# Patient Record
Sex: FEMALE | Race: BLACK OR AFRICAN AMERICAN | Employment: FULL TIME | URBAN - METROPOLITAN AREA
[De-identification: names, ages, dates, MRNs, and addresses within clinical notes are randomized per-mention and may not be internally consistent; named-entity substitution may affect disease eponyms.]

---

## 2017-12-23 LAB — PAP SMEAR, EXTERNAL: NORMAL

## 2018-01-19 LAB — MICROALBUMIN UR TEST STR-MCNC: NORMAL MG/DL

## 2018-03-12 LAB
CREATININE, EXTERNAL: 0.69
LDL-C, EXTERNAL: 139

## 2018-10-11 ENCOUNTER — OP HISTORICAL/CONVERTED ENCOUNTER (OUTPATIENT)
Dept: OTHER | Age: 53
End: 2018-10-11

## 2019-02-25 ENCOUNTER — OP HISTORICAL/CONVERTED ENCOUNTER (OUTPATIENT)
Dept: OTHER | Age: 54
End: 2019-02-25

## 2019-06-07 ENCOUNTER — OP HISTORICAL/CONVERTED ENCOUNTER (OUTPATIENT)
Dept: OTHER | Age: 54
End: 2019-06-07

## 2019-10-31 ENCOUNTER — OP HISTORICAL/CONVERTED ENCOUNTER (OUTPATIENT)
Dept: OTHER | Age: 54
End: 2019-10-31

## 2019-10-31 LAB — MAMMOGRAPHY, EXTERNAL: NORMAL

## 2020-07-27 RX ORDER — AMLODIPINE BESYLATE 5 MG/1
TABLET ORAL
Qty: 30 TAB | Refills: 1 | Status: SHIPPED | OUTPATIENT
Start: 2020-07-27 | End: 2020-08-07 | Stop reason: SDUPTHER

## 2020-07-29 RX ORDER — PRAVASTATIN SODIUM 10 MG/1
TABLET ORAL
Qty: 30 TAB | Refills: 1 | Status: SHIPPED | OUTPATIENT
Start: 2020-07-29 | End: 2020-09-24 | Stop reason: SDUPTHER

## 2020-08-04 VITALS
RESPIRATION RATE: 18 BRPM | OXYGEN SATURATION: 96 % | SYSTOLIC BLOOD PRESSURE: 124 MMHG | TEMPERATURE: 98.2 F | DIASTOLIC BLOOD PRESSURE: 74 MMHG | HEIGHT: 62 IN | WEIGHT: 146.4 LBS | HEART RATE: 100 BPM | BODY MASS INDEX: 26.94 KG/M2

## 2020-08-04 PROBLEM — Z91.14 NONCOMPLIANCE WITH MEDICATION REGIMEN: Status: ACTIVE | Noted: 2020-08-04

## 2020-08-04 PROBLEM — R51.9 HEADACHE: Status: ACTIVE | Noted: 2020-08-04

## 2020-08-04 PROBLEM — S79.929A INJURY OF THIGH: Status: ACTIVE | Noted: 2020-08-04

## 2020-08-04 PROBLEM — L50.8 CHRONIC URTICARIA: Status: ACTIVE | Noted: 2020-08-04

## 2020-08-04 PROBLEM — R20.2 PARESTHESIA: Status: ACTIVE | Noted: 2020-08-04

## 2020-08-04 PROBLEM — N63.10 MASS OF RIGHT BREAST: Status: ACTIVE | Noted: 2020-08-04

## 2020-08-04 PROBLEM — E78.2 MIXED HYPERLIPIDEMIA: Status: ACTIVE | Noted: 2020-08-04

## 2020-08-04 PROBLEM — K21.9 GERD (GASTROESOPHAGEAL REFLUX DISEASE): Status: ACTIVE | Noted: 2020-08-04

## 2020-08-04 PROBLEM — I10 ESSENTIAL HYPERTENSION: Status: ACTIVE | Noted: 2020-08-04

## 2020-08-04 PROBLEM — M15.9 DEGENERATIVE JOINT DISEASE INVOLVING MULTIPLE JOINTS: Status: ACTIVE | Noted: 2020-08-04

## 2020-08-04 RX ORDER — FEXOFENADINE HCL AND PSEUDOEPHEDRINE HCI 60; 120 MG/1; MG/1
1 TABLET, EXTENDED RELEASE ORAL EVERY 12 HOURS
COMMUNITY
End: 2020-10-02

## 2020-08-04 RX ORDER — DOXYCYCLINE 100 MG/1
100 CAPSULE ORAL 2 TIMES DAILY
COMMUNITY
End: 2020-10-02

## 2020-08-04 RX ORDER — METHOCARBAMOL 750 MG/1
TABLET, FILM COATED ORAL 4 TIMES DAILY
COMMUNITY
End: 2020-10-02

## 2020-08-04 RX ORDER — LOPERAMIDE HCL 2 MG
2 TABLET ORAL
COMMUNITY
End: 2020-10-02

## 2020-08-04 RX ORDER — BENZONATATE 200 MG/1
200 CAPSULE ORAL
COMMUNITY
End: 2020-10-02

## 2020-08-04 RX ORDER — HYOSCYAMINE SULFATE 0.125 MG
125 TABLET ORAL
COMMUNITY
End: 2020-10-02

## 2020-08-04 RX ORDER — IBUPROFEN 600 MG/1
TABLET ORAL
COMMUNITY
End: 2020-10-02

## 2020-08-04 RX ORDER — AZITHROMYCIN 250 MG/1
250 TABLET, FILM COATED ORAL DAILY
COMMUNITY
End: 2020-10-02

## 2020-08-04 RX ORDER — MELOXICAM 15 MG/1
15 TABLET ORAL DAILY
COMMUNITY
End: 2020-10-02

## 2020-08-04 RX ORDER — ONDANSETRON 8 MG/1
8 TABLET, ORALLY DISINTEGRATING ORAL
COMMUNITY
End: 2020-10-02

## 2020-08-04 RX ORDER — METHYLPREDNISOLONE 4 MG/1
TABLET ORAL
COMMUNITY
End: 2020-10-02

## 2020-08-04 RX ORDER — MONTELUKAST SODIUM 10 MG/1
10 TABLET ORAL DAILY
COMMUNITY
End: 2020-10-02

## 2020-08-04 RX ORDER — DICLOFENAC SODIUM 75 MG/1
TABLET, DELAYED RELEASE ORAL
COMMUNITY
End: 2020-10-02

## 2020-08-04 RX ORDER — POLYETHYLENE GLYCOL 3350, SODIUM SULFATE ANHYDROUS, SODIUM BICARBONATE, SODIUM CHLORIDE, POTASSIUM CHLORIDE 236; 22.74; 6.74; 5.86; 2.97 G/4L; G/4L; G/4L; G/4L; G/4L
POWDER, FOR SOLUTION ORAL
COMMUNITY
End: 2020-10-02

## 2020-08-04 RX ORDER — SULFAMETHOXAZOLE AND TRIMETHOPRIM 800; 160 MG/1; MG/1
1 TABLET ORAL 2 TIMES DAILY
COMMUNITY
End: 2020-10-02

## 2020-08-04 RX ORDER — ACETAMINOPHEN 500 MG
TABLET ORAL
COMMUNITY

## 2020-08-07 ENCOUNTER — OFFICE VISIT (OUTPATIENT)
Dept: FAMILY MEDICINE CLINIC | Age: 55
End: 2020-08-07
Payer: COMMERCIAL

## 2020-08-07 VITALS
HEART RATE: 65 BPM | BODY MASS INDEX: 27.61 KG/M2 | HEIGHT: 63 IN | SYSTOLIC BLOOD PRESSURE: 143 MMHG | RESPIRATION RATE: 18 BRPM | DIASTOLIC BLOOD PRESSURE: 86 MMHG | OXYGEN SATURATION: 99 % | WEIGHT: 155.8 LBS | TEMPERATURE: 97.7 F

## 2020-08-07 DIAGNOSIS — I10 ESSENTIAL HYPERTENSION: ICD-10-CM

## 2020-08-07 DIAGNOSIS — Z12.4 PAP SMEAR FOR CERVICAL CANCER SCREENING: Primary | ICD-10-CM

## 2020-08-07 DIAGNOSIS — E78.2 MIXED HYPERLIPIDEMIA: ICD-10-CM

## 2020-08-07 DIAGNOSIS — M15.9 PRIMARY OSTEOARTHRITIS INVOLVING MULTIPLE JOINTS: ICD-10-CM

## 2020-08-07 DIAGNOSIS — Z12.31 ENCOUNTER FOR SCREENING MAMMOGRAM FOR BREAST CANCER: ICD-10-CM

## 2020-08-07 PROCEDURE — 99396 PREV VISIT EST AGE 40-64: CPT | Performed by: FAMILY MEDICINE

## 2020-08-07 RX ORDER — AMLODIPINE BESYLATE 5 MG/1
5 TABLET ORAL DAILY
Qty: 30 TAB | Refills: 2 | Status: SHIPPED | OUTPATIENT
Start: 2020-08-07 | End: 2020-10-23

## 2020-08-07 NOTE — PROGRESS NOTES
Michaela Starkey is a 47 y.o. female and presents with Gyn Exam  .  HPI     Subjective:  Cardiovascular Review:  The patient has hypertension   Diet and Lifestyle: generally follows a low fat low cholesterol diet, generally follows a low sodium diet, exercises sporadically  Home BP Monitoring: is not measured at home. Pertinent ROS: taking medications as instructed, no medication side effects noted, no TIA's, no chest pain on exertion, no dyspnea on exertion, no swelling of ankles. Review of Systems  Review of Systems   Constitutional: Negative. Negative for chills and fever. HENT: Negative. Negative for congestion, ear discharge, hearing loss, nosebleeds and tinnitus. Eyes: Negative. Negative for blurred vision, double vision, photophobia and pain. Respiratory: Negative. Negative for cough, hemoptysis and sputum production. Cardiovascular: Negative. Negative for chest pain and palpitations. Gastrointestinal: Negative. Negative for heartburn, nausea and vomiting. Genitourinary: Negative. Negative for dysuria, frequency and urgency. Musculoskeletal: Negative. Negative for back pain and myalgias. Skin: Negative. Neurological: Negative. Negative for dizziness, tingling, weakness and headaches. Endo/Heme/Allergies: Negative. Psychiatric/Behavioral: Negative. Negative for depression and suicidal ideas. The patient does not have insomnia. All other systems reviewed and are negative.         Past Medical History:   Diagnosis Date    Allergies     GERD (gastroesophageal reflux disease)     Headache     History of throat problem     Hypercholesterolemia     Hypertension     Sinus problem      Past Surgical History:   Procedure Laterality Date    HX GYN      HX ORTHOPAEDIC Left 2001    knee    HX PARTIAL HYSTERECTOMY      HX TUBAL LIGATION  1993     Social History     Socioeconomic History    Marital status:      Spouse name: Not on file    Number of children: Not on file    Years of education: Not on file    Highest education level: Not on file   Tobacco Use    Smoking status: Never Smoker    Smokeless tobacco: Never Used   Substance and Sexual Activity    Alcohol use: Yes     Frequency: Monthly or less     Family History   Problem Relation Age of Onset    Hypertension Father      Current Outpatient Medications   Medication Sig Dispense Refill    amLODIPine (NORVASC) 5 mg tablet Take 1 Tab by mouth daily. 30 Tab 2    acetaminophen (TYLENOL) 500 mg tablet Take  by mouth every six (6) hours as needed for Pain.  diclofenac EC (VOLTAREN) 75 mg EC tablet Take  by mouth.  hyoscyamine (ANASPAZ, LEVSIN) 0.125 mg tablet Take 125 mcg by mouth every four (4) hours as needed for Cramping.  loperamide (Imodium A-D) 2 mg tablet Take 2 mg by mouth four (4) times daily as needed for Diarrhea.  ondansetron (ZOFRAN ODT) 8 mg disintegrating tablet Take 8 mg by mouth every eight (8) hours as needed for Nausea or Vomiting.  fexofenadine-pseudoephedrine (Allegra-D 12 Hour)  mg Tb12 Take 1 Tab by mouth every twelve (12) hours.  benzonatate (TESSALON) 200 mg capsule Take 200 mg by mouth three (3) times daily as needed for Cough.  PEG 3350-Electrolytes (GaviLyte-G) 236-22.74-6.74 -5.86 gram suspension Take  by mouth now.  HYDROXYZINE HCL PO Take  by mouth.  ibuprofen (MOTRIN) 600 mg tablet Take  by mouth every six (6) hours as needed for Pain.  meloxicam (MOBIC) 15 mg tablet Take 15 mg by mouth daily.  methocarbamoL (ROBAXIN) 750 mg tablet Take  by mouth four (4) times daily.  methylPREDNISolone (MEDROL DOSEPACK) 4 mg tablet Take  by mouth.  montelukast (SINGULAIR) 10 mg tablet Take 10 mg by mouth daily.  doxycycline (VIBRAMYCIN) 100 mg capsule Take 100 mg by mouth two (2) times a day.  trimethoprim-sulfamethoxazole (BACTRIM DS, SEPTRA DS) 160-800 mg per tablet Take 1 Tab by mouth two (2) times a day.       azithromycin (ZITHROMAX) 250 mg tablet Take 250 mg by mouth daily.  pravastatin (PRAVACHOL) 10 mg tablet TAKE 1 TABLET BY MOUTH EVERY DAY 30 Tab 1     Allergies   Allergen Reactions    Cat Dander Other (comments)    Dog Dander Other (comments)    Pcn [Penicillins] Other (comments)       Objective:  Visit Vitals  /86 (BP 1 Location: Left arm, BP Patient Position: Sitting)   Pulse 65   Temp 97.7 °F (36.5 °C) (Temporal)   Resp 18   Ht 5' 3\" (1.6 m)   Wt 155 lb 12.8 oz (70.7 kg)   LMP  (LMP Unknown) Comment: partial hysterectomy   SpO2 99% Comment: room air   BMI 27.60 kg/m²       Physical Exam:   Physical Exam  Vitals signs and nursing note reviewed. Constitutional:       Appearance: Normal appearance. She is obese. HENT:      Head: Normocephalic and atraumatic. Right Ear: Tympanic membrane, ear canal and external ear normal.      Left Ear: Tympanic membrane, ear canal and external ear normal.      Nose: Nose normal.      Mouth/Throat:      Mouth: Mucous membranes are moist.      Pharynx: Oropharynx is clear. No oropharyngeal exudate or posterior oropharyngeal erythema. Eyes:      General: No scleral icterus. Right eye: No discharge. Left eye: No discharge. Extraocular Movements: Extraocular movements intact. Conjunctiva/sclera: Conjunctivae normal.      Pupils: Pupils are equal, round, and reactive to light. Neck:      Musculoskeletal: Normal range of motion and neck supple. No neck rigidity or muscular tenderness. Vascular: No carotid bruit. Cardiovascular:      Rate and Rhythm: Normal rate. Pulses: Normal pulses. Heart sounds: Normal heart sounds. No murmur. No gallop. Pulmonary:      Effort: Pulmonary effort is normal. No respiratory distress. Breath sounds: Normal breath sounds. No stridor. No wheezing, rhonchi or rales. Chest:      Chest wall: No tenderness. Abdominal:      General: Bowel sounds are normal. There is no distension. Palpations: Abdomen is soft. There is no mass. Tenderness: There is no abdominal tenderness. There is no right CVA tenderness, left CVA tenderness or rebound. Hernia: No hernia is present. Genitourinary:     General: Normal vulva. Vagina: No vaginal discharge. Musculoskeletal: Normal range of motion. General: No swelling, tenderness, deformity or signs of injury. Right lower leg: No edema. Left lower leg: No edema. Lymphadenopathy:      Cervical: No cervical adenopathy. Skin:     General: Skin is warm. Capillary Refill: Capillary refill takes 2 to 3 seconds. Coloration: Skin is not jaundiced or pale. Findings: No bruising, erythema, lesion or rash. Neurological:      General: No focal deficit present. Mental Status: She is alert and oriented to person, place, and time. Cranial Nerves: No cranial nerve deficit. Sensory: No sensory deficit. Motor: No weakness. Coordination: Coordination normal.      Gait: Gait normal.      Deep Tendon Reflexes: Reflexes normal.   Psychiatric:         Mood and Affect: Mood normal.         Behavior: Behavior normal.         Thought Content: Thought content normal.         Judgment: Judgment normal.             Results for orders placed or performed in visit on 08/04/20    MAMMOGRAPHY   Result Value Ref Range    Mammography, External       3.8 mm oval nodule in the medial right breast may be an intramammary lymph node. AMB EXT LDL-C   Result Value Ref Range    LDL-C, External 139    AMB EXT CREATININE   Result Value Ref Range    Creatinine, External 0.69    AMB EXT URINE MICROALBUMIN   Result Value Ref Range    Urine Microalbumin, External Neg.  PAP SMEAR   Result Value Ref Range    Pap Smear, External WNL. Assessment/Plan:    ICD-10-CM ICD-9-CM    1. Pap smear for cervical cancer screening  Z12.4 V76.2 PAP SMEAR, NON-TRADITIONAL      [facility] MAMMO BI SCREENING INCL CAD   2.  Encounter for screening mammogram for breast cancer  Z12.31 V76.12 PAP SMEAR, NON-TRADITIONAL      [facility] MAMMO BI SCREENING INCL CAD   3. Mixed hyperlipidemia  E78.2 272.2    4. Primary osteoarthritis involving multiple joints  M89.49 715.98    5. Essential hypertension  I10 401.9      Orders Placed This Encounter    [facility] MAMMO BI SCREENING INCL CAD     Standing Status:   Future     Standing Expiration Date:   9/7/2021    amLODIPine (NORVASC) 5 mg tablet     Sig: Take 1 Tab by mouth daily. Dispense:  30 Tab     Refill:  2    PAP SMEAR, NON-TRADITIONAL     Order Specific Question:   Pap Source? Answer:   Vaginal     Order Specific Question:   Total Hysterectomy? Answer:   No     Order Specific Question:   Supracervical Hysterectomy? Answer:   Yes     Order Specific Question:   Post Menopausal?     Answer:   No     Order Specific Question:   Hormone Therapy? Answer:   No     Order Specific Question:   IUD? Answer:   No     Order Specific Question:   Abnormal Bleeding? Answer:   No     Order Specific Question:   Pregnant     Answer:   No     Order Specific Question:   Post Partum? Answer:   No     Cannot display discharge medications since this is not an admission. Follow-up and Dispositions    · Return in about 3 months (around 11/7/2020).

## 2020-08-13 LAB
AMBIGUOUS MONOLAYER PAP: NORMAL
CYTOLOGIST CVX/VAG CYTO: NORMAL
CYTOLOGY CVX/VAG DOC CYTO: NORMAL
DX ICD CODE: NORMAL
Lab: NORMAL
OTHER STN SPEC: NORMAL
STAT OF ADQ CVX/VAG CYTO-IMP: NORMAL

## 2020-09-06 PROBLEM — Z12.4 PAP SMEAR FOR CERVICAL CANCER SCREENING: Status: RESOLVED | Noted: 2020-08-07 | Resolved: 2020-09-06

## 2020-09-06 PROBLEM — Z12.31 ENCOUNTER FOR SCREENING MAMMOGRAM FOR BREAST CANCER: Status: RESOLVED | Noted: 2020-08-07 | Resolved: 2020-09-06

## 2020-09-24 RX ORDER — PRAVASTATIN SODIUM 10 MG/1
10 TABLET ORAL
Qty: 60 TAB | Refills: 0 | Status: SHIPPED | OUTPATIENT
Start: 2020-09-24 | End: 2020-11-24

## 2020-10-02 ENCOUNTER — OFFICE VISIT (OUTPATIENT)
Dept: FAMILY MEDICINE CLINIC | Age: 55
End: 2020-10-02
Payer: COMMERCIAL

## 2020-10-02 VITALS
HEIGHT: 63 IN | WEIGHT: 155.2 LBS | TEMPERATURE: 98.4 F | DIASTOLIC BLOOD PRESSURE: 80 MMHG | SYSTOLIC BLOOD PRESSURE: 150 MMHG | RESPIRATION RATE: 20 BRPM | OXYGEN SATURATION: 98 % | BODY MASS INDEX: 27.5 KG/M2 | HEART RATE: 81 BPM

## 2020-10-02 DIAGNOSIS — M15.9 PRIMARY OSTEOARTHRITIS INVOLVING MULTIPLE JOINTS: ICD-10-CM

## 2020-10-02 DIAGNOSIS — Z96.652 HISTORY OF ARTHROPLASTY OF LEFT KNEE: ICD-10-CM

## 2020-10-02 DIAGNOSIS — Z98.890 HX OF LEFT KNEE SURGERY: ICD-10-CM

## 2020-10-02 DIAGNOSIS — I11.9 MALIGNANT HYPERTENSIVE HEART DISEASE WITHOUT HEART FAILURE: Primary | ICD-10-CM

## 2020-10-02 DIAGNOSIS — E78.2 MIXED HYPERLIPIDEMIA: ICD-10-CM

## 2020-10-02 PROCEDURE — 99214 OFFICE O/P EST MOD 30 MIN: CPT | Performed by: FAMILY MEDICINE

## 2020-10-02 RX ORDER — NAPROXEN 500 MG/1
500 TABLET ORAL 2 TIMES DAILY WITH MEALS
Qty: 30 TAB | Refills: 1 | Status: SHIPPED | OUTPATIENT
Start: 2020-10-02

## 2020-10-02 NOTE — PROGRESS NOTES
Alexis Hinson is a 47 y.o. female and presents with Hip Pain (bilateral stiffness when she goes from sitting to standing); Arm Pain (right); and Hand Pain (right)  . HPI   Pt with a hx of HTN c/o bilateral hip  To lower back   Subjective:  Cardiovascular Review:  The patient has hypertension   Diet and Lifestyle: generally follows a low fat low cholesterol diet, generally follows a low sodium diet, exercises sporadically  Home BP Monitoring: is not measured at home. Pertinent ROS: taking medications as instructed, no medication side effects noted, no TIA's, no chest pain on exertion, no dyspnea on exertion, no swelling of ankles. Review of Systems  Review of Systems   Constitutional: Negative. Negative for chills and fever. HENT: Negative. Negative for congestion, ear discharge, hearing loss, nosebleeds and tinnitus. Eyes: Negative. Negative for blurred vision, double vision, photophobia and pain. Respiratory: Negative. Negative for cough, hemoptysis and sputum production. Cardiovascular: Negative. Negative for chest pain and palpitations. Gastrointestinal: Negative. Negative for heartburn, nausea and vomiting. Genitourinary: Negative. Negative for dysuria, frequency and urgency. Musculoskeletal: Positive for joint pain. Negative for back pain and myalgias. Skin: Negative. Neurological: Negative. Negative for dizziness, tingling, weakness and headaches. Endo/Heme/Allergies: Negative. Psychiatric/Behavioral: Negative. Negative for depression and suicidal ideas. The patient does not have insomnia. All other systems reviewed and are negative.         Past Medical History:   Diagnosis Date    Allergies     GERD (gastroesophageal reflux disease)     Headache     History of throat problem     Hypercholesterolemia     Hypertension     Sinus problem      Past Surgical History:   Procedure Laterality Date    HX GYN      HX ORTHOPAEDIC Left 2001    knee    HX PARTIAL HYSTERECTOMY      HX TUBAL LIGATION  1993     Social History     Socioeconomic History    Marital status:      Spouse name: Not on file    Number of children: Not on file    Years of education: Not on file    Highest education level: Not on file   Tobacco Use    Smoking status: Never Smoker    Smokeless tobacco: Never Used   Substance and Sexual Activity    Alcohol use: Yes     Frequency: Monthly or less     Family History   Problem Relation Age of Onset    Hypertension Father      Current Outpatient Medications   Medication Sig Dispense Refill    pravastatin (PRAVACHOL) 10 mg tablet Take 1 Tab by mouth nightly for 60 days. 60 Tab 0    amLODIPine (NORVASC) 5 mg tablet Take 1 Tab by mouth daily. 30 Tab 2    acetaminophen (TYLENOL) 500 mg tablet Take  by mouth every six (6) hours as needed for Pain. Allergies   Allergen Reactions    Cat Dander Other (comments)    Dog Dander Other (comments)    Pcn [Penicillins] Other (comments)       Objective:  Visit Vitals  BP (!) 150/80 (BP 1 Location: Left arm, BP Patient Position: Sitting)   Pulse 81   Temp 98.4 °F (36.9 °C) (Temporal)   Resp 20   Ht 5' 3\" (1.6 m)   Wt 155 lb 3.2 oz (70.4 kg)   SpO2 98% Comment: room air   BMI 27.49 kg/m²       Physical Exam:   Physical Exam  Vitals signs and nursing note reviewed. Constitutional:       Appearance: Normal appearance. She is obese. HENT:      Head: Normocephalic and atraumatic. Right Ear: Tympanic membrane, ear canal and external ear normal.      Left Ear: Tympanic membrane, ear canal and external ear normal.      Nose: Nose normal.      Mouth/Throat:      Mouth: Mucous membranes are moist.      Pharynx: Oropharynx is clear. No oropharyngeal exudate or posterior oropharyngeal erythema. Eyes:      General: No scleral icterus. Right eye: No discharge. Left eye: No discharge. Extraocular Movements: Extraocular movements intact.       Conjunctiva/sclera: Conjunctivae normal. Pupils: Pupils are equal, round, and reactive to light. Neck:      Musculoskeletal: Normal range of motion and neck supple. No neck rigidity or muscular tenderness. Vascular: No carotid bruit. Cardiovascular:      Rate and Rhythm: Normal rate. Pulses: Normal pulses. Heart sounds: Normal heart sounds. No murmur. No gallop. Pulmonary:      Effort: Pulmonary effort is normal. No respiratory distress. Breath sounds: Normal breath sounds. No stridor. No wheezing, rhonchi or rales. Chest:      Chest wall: No tenderness. Abdominal:      General: Bowel sounds are normal. There is no distension. Palpations: Abdomen is soft. There is no mass. Tenderness: There is no abdominal tenderness. There is no right CVA tenderness, left CVA tenderness or rebound. Hernia: No hernia is present. Musculoskeletal: Normal range of motion. General: No swelling, tenderness, deformity or signs of injury. Right lower leg: No edema. Left lower leg: No edema. Lymphadenopathy:      Cervical: No cervical adenopathy. Skin:     General: Skin is warm. Capillary Refill: Capillary refill takes 2 to 3 seconds. Coloration: Skin is not jaundiced or pale. Findings: No bruising, erythema, lesion or rash. Neurological:      General: No focal deficit present. Mental Status: She is alert and oriented to person, place, and time. Cranial Nerves: No cranial nerve deficit. Sensory: No sensory deficit. Motor: No weakness. Coordination: Coordination normal.      Gait: Gait normal.      Deep Tendon Reflexes: Reflexes normal.   Psychiatric:         Mood and Affect: Mood normal.         Behavior: Behavior normal.         Thought Content:  Thought content normal.         Judgment: Judgment normal.             Results for orders placed or performed in visit on 08/07/20   PAP, LIQUID BASED, MANUAL SCREEN   Result Value Ref Range    Diagnosis Comment     Specimen adequacy Comment     Clinician provided ICD10 Comment     Performed by: Comment     . Denver Gross Note: Comment    AMBIGUOUS MONOLAYER PAP   Result Value Ref Range    AMBIGUOUS MONOLAYER PAP Comment        Assessment/Plan:    ICD-10-CM ICD-9-CM    1. Malignant hypertensive heart disease without heart failure  I11.9 402.00 LIPID PANEL      METABOLIC PANEL, BASIC   2. Mixed hyperlipidemia  E78.2 272.2 LIPID PANEL      METABOLIC PANEL, BASIC   3. Primary osteoarthritis involving multiple joints  M89.49 715.98    4. Hx of left knee surgery  Z98.890 V15.29    5. History of arthroplasty of left knee  Z96.652 V43.65      No orders of the defined types were placed in this encounter. Cannot display discharge medications since this is not an admission.

## 2020-10-23 RX ORDER — AMLODIPINE BESYLATE 5 MG/1
TABLET ORAL
Qty: 30 TAB | Refills: 2 | Status: SHIPPED | OUTPATIENT
Start: 2020-10-23 | End: 2021-01-24

## 2020-11-13 ENCOUNTER — TRANSCRIBE ORDER (OUTPATIENT)
Dept: SCHEDULING | Age: 55
End: 2020-11-13

## 2020-11-13 DIAGNOSIS — Z12.31 VISIT FOR SCREENING MAMMOGRAM: Primary | ICD-10-CM

## 2020-11-24 RX ORDER — PRAVASTATIN SODIUM 10 MG/1
TABLET ORAL
Qty: 60 TAB | Refills: 0 | Status: SHIPPED | OUTPATIENT
Start: 2020-11-24 | End: 2021-01-24

## 2020-12-17 ENCOUNTER — OFFICE VISIT (OUTPATIENT)
Dept: FAMILY MEDICINE CLINIC | Age: 55
End: 2020-12-17
Payer: COMMERCIAL

## 2020-12-17 VITALS
SYSTOLIC BLOOD PRESSURE: 150 MMHG | HEIGHT: 62 IN | RESPIRATION RATE: 20 BRPM | OXYGEN SATURATION: 99 % | TEMPERATURE: 96.8 F | BODY MASS INDEX: 29.08 KG/M2 | WEIGHT: 158 LBS | HEART RATE: 93 BPM | DIASTOLIC BLOOD PRESSURE: 86 MMHG

## 2020-12-17 DIAGNOSIS — R10.13 EPIGASTRIC PAIN: Primary | ICD-10-CM

## 2020-12-17 DIAGNOSIS — K59.1 FUNCTIONAL DIARRHEA: ICD-10-CM

## 2020-12-17 DIAGNOSIS — R10.32 LEFT LOWER QUADRANT ABDOMINAL PAIN: ICD-10-CM

## 2020-12-17 LAB
BILIRUB UR QL STRIP: NEGATIVE
GLUCOSE UR-MCNC: NEGATIVE MG/DL
KETONES P FAST UR STRIP-MCNC: NEGATIVE MG/DL
PH UR STRIP: 5.5 [PH] (ref 4.6–8)
PROT UR QL STRIP: NEGATIVE
SP GR UR STRIP: 1.01 (ref 1–1.03)
UA UROBILINOGEN AMB POC: NORMAL (ref 0.2–1)
URINALYSIS CLARITY POC: CLEAR
URINALYSIS COLOR POC: YELLOW
URINE BLOOD POC: NORMAL
URINE LEUKOCYTES POC: NEGATIVE
URINE NITRITES POC: NEGATIVE

## 2020-12-17 PROCEDURE — 81003 URINALYSIS AUTO W/O SCOPE: CPT | Performed by: FAMILY MEDICINE

## 2020-12-17 PROCEDURE — 99213 OFFICE O/P EST LOW 20 MIN: CPT | Performed by: FAMILY MEDICINE

## 2020-12-17 RX ORDER — HYOSCYAMINE SULFATE 0.125 MG
125 TABLET ORAL
Qty: 20 TAB | Refills: 0 | Status: SHIPPED | OUTPATIENT
Start: 2020-12-17 | End: 2020-12-22

## 2020-12-17 NOTE — PROGRESS NOTES
HPI    Alexis Hinson is a 47 y.o. female and presents today for Abdominal Pain (since yesterday)  . HPI   46 yo AAF with a hx of HTN c/o a cramping lower abdominal pain 8/10 intensity with radiation from the epigastric and suprapubic area to Bilateral lower quadrants. Denies heartburn or N and V. Denies dysuria or frequency but states diarrhea x 2 last night and this morning x 2 . Had apartial hysterectomy over 5 years ago and no menses since then  Allergies    Allergies   Allergen Reactions    Cat Dander Other (comments)    Dog Dander Other (comments)    Pcn [Penicillins] Other (comments)        Medications    Current Outpatient Medications   Medication Sig Dispense    pravastatin (PRAVACHOL) 10 mg tablet TAKE 1 TABLET BY MOUTH EVERY NIGHT 60 Tab    amLODIPine (NORVASC) 5 mg tablet TAKE 1 TABLET BY MOUTH EVERY DAY IN THE MORNING 30 Tab    naproxen (NAPROSYN) 500 mg tablet Take 1 Tab by mouth two (2) times daily (with meals). 30 Tab    acetaminophen (TYLENOL) 500 mg tablet Take  by mouth every six (6) hours as needed for Pain. No current facility-administered medications for this visit.          Health Maintenance    Health Maintenance Due   Topic Date Due    Hepatitis C Screening  1965    DTaP/Tdap/Td series (1 - Tdap) 12/28/1986    Shingrix Vaccine Age 50> (1 of 2) 12/28/2015    Colorectal Cancer Screening Combo  12/28/2015    Lipid Screen  03/12/2019        Problem List    Patient Active Problem List    Diagnosis Date Noted    Hx of left knee surgery 10/02/2020    History of arthroplasty of left knee 10/02/2020    Malignant hypertensive heart disease without heart failure 10/02/2020    Body mass index (bmi) 25.0-25.9, adult 08/04/2020    Chronic urticaria 08/04/2020    Degenerative joint disease involving multiple joints 08/04/2020    Essential hypertension 08/04/2020    GERD (gastroesophageal reflux disease) 08/04/2020    Headache 08/04/2020    Injury of thigh 08/04/2020    Mass of right breast 08/04/2020    Mixed hyperlipidemia 08/04/2020    Noncompliance with medication regimen 08/04/2020    Paresthesia 08/04/2020        Family Hx    Family History   Problem Relation Age of Onset    Hypertension Father         Social Hx    Social History     Socioeconomic History    Marital status:      Spouse name: Not on file    Number of children: Not on file    Years of education: Not on file    Highest education level: Not on file   Tobacco Use    Smoking status: Never Smoker    Smokeless tobacco: Never Used   Substance and Sexual Activity    Alcohol use: Yes     Frequency: Monthly or less        Surgical Hx    Past Surgical History:   Procedure Laterality Date    HX GYN      HX ORTHOPAEDIC Left 2001    knee    HX PARTIAL HYSTERECTOMY      HX TUBAL LIGATION  1993          Vitals    Visit Vitals  BP (!) 150/86 (BP 1 Location: Right arm, BP Patient Position: Sitting)   Pulse 93   Temp 96.8 °F (36 °C) (Temporal)   Resp 20   Ht 5' 2\" (1.575 m)   Wt 158 lb (71.7 kg)   LMP  (LMP Unknown) Comment: partial hysterectomy   SpO2 99% Comment: room air   BMI 28.90 kg/m²        ROS    Review of Systems   Constitutional: Negative. Negative for chills and fever. HENT: Negative. Negative for congestion, ear discharge, hearing loss, nosebleeds and tinnitus. Eyes: Negative. Negative for blurred vision, double vision, photophobia and pain. Respiratory: Negative. Negative for cough, hemoptysis and sputum production. Cardiovascular: Negative. Negative for chest pain and palpitations. Gastrointestinal: Positive for abdominal pain and diarrhea. Negative for heartburn, nausea and vomiting. Genitourinary: Negative. Negative for dysuria, frequency and urgency. Musculoskeletal: Negative. Negative for back pain and myalgias. Skin: Negative. Neurological: Negative. Negative for dizziness, tingling, weakness and headaches. Endo/Heme/Allergies: Negative.     Psychiatric/Behavioral: Negative. Negative for depression and suicidal ideas. The patient does not have insomnia. All other systems reviewed and are negative. Physical Exam      Physical Exam  Vitals signs and nursing note reviewed. Constitutional:       Appearance: Normal appearance. She is obese. HENT:      Head: Normocephalic and atraumatic. Right Ear: Tympanic membrane, ear canal and external ear normal.      Left Ear: Tympanic membrane, ear canal and external ear normal.      Nose: Nose normal.      Mouth/Throat:      Mouth: Mucous membranes are moist.      Pharynx: Oropharynx is clear. No oropharyngeal exudate or posterior oropharyngeal erythema. Eyes:      General: No scleral icterus. Right eye: No discharge. Left eye: No discharge. Extraocular Movements: Extraocular movements intact. Conjunctiva/sclera: Conjunctivae normal.      Pupils: Pupils are equal, round, and reactive to light. Neck:      Musculoskeletal: Normal range of motion and neck supple. No neck rigidity or muscular tenderness. Vascular: No carotid bruit. Cardiovascular:      Rate and Rhythm: Normal rate. Pulses: Normal pulses. Heart sounds: Normal heart sounds. No murmur. No gallop. Pulmonary:      Effort: Pulmonary effort is normal. No respiratory distress. Breath sounds: Normal breath sounds. No stridor. No wheezing, rhonchi or rales. Chest:      Chest wall: No tenderness. Abdominal:      General: Bowel sounds are normal. There is no distension. Palpations: Abdomen is soft. There is no mass. Tenderness: There is no abdominal tenderness (epigastric and suprapubic). There is no right CVA tenderness, left CVA tenderness or rebound. Hernia: No hernia is present. Musculoskeletal: Normal range of motion. General: No swelling, tenderness, deformity or signs of injury. Right lower leg: No edema. Left lower leg: No edema.    Lymphadenopathy:      Cervical: No cervical adenopathy. Skin:     General: Skin is warm. Capillary Refill: Capillary refill takes 2 to 3 seconds. Coloration: Skin is not jaundiced or pale. Findings: No bruising, erythema, lesion or rash. Neurological:      General: No focal deficit present. Mental Status: She is alert and oriented to person, place, and time. Cranial Nerves: No cranial nerve deficit. Sensory: No sensory deficit. Motor: No weakness. Coordination: Coordination normal.      Gait: Gait normal.      Deep Tendon Reflexes: Reflexes normal.   Psychiatric:         Mood and Affect: Mood normal.         Behavior: Behavior normal.         Thought Content: Thought content normal.         Judgment: Judgment normal.          Assessment/Plan    Diagnoses and all orders for this visit:    1. Epigastric pain         Health Maintenance Items reviewed with patient as noted.

## 2021-01-04 ENCOUNTER — OFFICE VISIT (OUTPATIENT)
Dept: FAMILY MEDICINE CLINIC | Age: 56
End: 2021-01-04
Payer: COMMERCIAL

## 2021-01-04 VITALS
HEART RATE: 79 BPM | OXYGEN SATURATION: 99 % | RESPIRATION RATE: 18 BRPM | BODY MASS INDEX: 28.78 KG/M2 | DIASTOLIC BLOOD PRESSURE: 86 MMHG | WEIGHT: 156.4 LBS | SYSTOLIC BLOOD PRESSURE: 142 MMHG | TEMPERATURE: 97.3 F | HEIGHT: 62 IN

## 2021-01-04 DIAGNOSIS — E78.2 MIXED HYPERLIPIDEMIA: Primary | ICD-10-CM

## 2021-01-04 DIAGNOSIS — E66.3 OVERWEIGHT (BMI 25.0-29.9): ICD-10-CM

## 2021-01-04 DIAGNOSIS — I11.9 MALIGNANT HYPERTENSIVE HEART DISEASE WITHOUT HEART FAILURE: ICD-10-CM

## 2021-01-04 DIAGNOSIS — D17.1 LIPOMA OF CHEST WALL: ICD-10-CM

## 2021-01-04 DIAGNOSIS — K21.00 GASTROESOPHAGEAL REFLUX DISEASE WITH ESOPHAGITIS WITHOUT HEMORRHAGE: ICD-10-CM

## 2021-01-04 PROCEDURE — 99214 OFFICE O/P EST MOD 30 MIN: CPT | Performed by: FAMILY MEDICINE

## 2021-01-04 NOTE — PROGRESS NOTES
Melvyn Dakins is a 54 y.o. female and presents with Follow Up Chronic Condition and Hypertension  . HPI   Patient c/o left lateral chest asymmetry with a soft growth for several months. Denies pain or trauma    Subjective:  Cardiovascular Review:  The patient has hypertension   Diet and Lifestyle: generally follows a low fat low cholesterol diet, generally follows a low sodium diet, exercises sporadically  Home BP Monitoring: is not measured at home. Pertinent ROS: taking medications as instructed, no medication side effects noted, no TIA's, no chest pain on exertion, no dyspnea on exertion, no swelling of ankles. Review of Systems  Review of Systems   Constitutional: Negative. Negative for chills and fever. HENT: Negative. Negative for congestion, ear discharge, hearing loss, nosebleeds and tinnitus. Eyes: Negative. Negative for blurred vision, double vision, photophobia and pain. Respiratory: Negative. Negative for cough, hemoptysis and sputum production. Cardiovascular: Negative. Negative for chest pain and palpitations. Gastrointestinal: Negative. Negative for heartburn, nausea and vomiting. Genitourinary: Negative. Negative for dysuria, frequency and urgency. Musculoskeletal: Negative. Negative for back pain and myalgias. Skin: Negative. Neurological: Negative. Negative for dizziness, tingling, weakness and headaches. Endo/Heme/Allergies: Negative. Psychiatric/Behavioral: Negative. Negative for depression and suicidal ideas. The patient does not have insomnia. All other systems reviewed and are negative.         Past Medical History:   Diagnosis Date    Allergies     GERD (gastroesophageal reflux disease)     Headache     History of throat problem     Hypercholesterolemia     Hypertension     Sinus problem      Past Surgical History:   Procedure Laterality Date    HX GYN      HX ORTHOPAEDIC Left 2001    knee    HX PARTIAL HYSTERECTOMY      HX TUBAL LIGATION  1993     Social History     Socioeconomic History    Marital status:      Spouse name: Not on file    Number of children: Not on file    Years of education: Not on file    Highest education level: Not on file   Tobacco Use    Smoking status: Never Smoker    Smokeless tobacco: Never Used   Substance and Sexual Activity    Alcohol use: Yes     Frequency: Monthly or less     Family History   Problem Relation Age of Onset    Hypertension Father      Current Outpatient Medications   Medication Sig Dispense Refill    pravastatin (PRAVACHOL) 10 mg tablet TAKE 1 TABLET BY MOUTH EVERY NIGHT 60 Tab 0    amLODIPine (NORVASC) 5 mg tablet TAKE 1 TABLET BY MOUTH EVERY DAY IN THE MORNING 30 Tab 2    naproxen (NAPROSYN) 500 mg tablet Take 1 Tab by mouth two (2) times daily (with meals). 30 Tab 1    acetaminophen (TYLENOL) 500 mg tablet Take  by mouth every six (6) hours as needed for Pain. Allergies   Allergen Reactions    Cat Dander Other (comments)    Dog Dander Other (comments)    Pcn [Penicillins] Other (comments)       Objective:  Visit Vitals  BP (!) 142/86 (BP 1 Location: Right arm, BP Patient Position: Sitting)   Pulse 79   Temp 97.3 °F (36.3 °C) (Temporal)   Resp 18   Ht 5' 2\" (1.575 m)   Wt 156 lb 6.4 oz (70.9 kg)   LMP  (LMP Unknown) Comment: partial hysterectomy   SpO2 99% Comment: room air   BMI 28.61 kg/m²       Physical Exam:   Physical Exam  Vitals signs and nursing note reviewed. Constitutional:       Appearance: Normal appearance. She is obese. HENT:      Head: Normocephalic and atraumatic. Right Ear: Tympanic membrane, ear canal and external ear normal.      Left Ear: Tympanic membrane, ear canal and external ear normal.      Nose: Nose normal.      Mouth/Throat:      Mouth: Mucous membranes are moist.      Pharynx: Oropharynx is clear. No oropharyngeal exudate or posterior oropharyngeal erythema. Eyes:      General: No scleral icterus.         Right eye: No discharge. Left eye: No discharge. Extraocular Movements: Extraocular movements intact. Conjunctiva/sclera: Conjunctivae normal.      Pupils: Pupils are equal, round, and reactive to light. Neck:      Musculoskeletal: Normal range of motion and neck supple. No neck rigidity or muscular tenderness. Vascular: No carotid bruit. Cardiovascular:      Rate and Rhythm: Normal rate. Pulses: Normal pulses. Heart sounds: Normal heart sounds. No murmur. No gallop. Pulmonary:      Effort: Pulmonary effort is normal. No respiratory distress. Breath sounds: Normal breath sounds. No stridor. No wheezing, rhonchi or rales. Chest:      Chest wall: No tenderness. Abdominal:      General: Bowel sounds are normal. There is no distension. Palpations: Abdomen is soft. There is no mass. Tenderness: There is no abdominal tenderness. There is no right CVA tenderness, left CVA tenderness or rebound. Hernia: No hernia is present. Musculoskeletal: Normal range of motion. General: Deformity (left lateral midchest 5x6cm round soft nontender growth) present. No swelling, tenderness or signs of injury. Right lower leg: No edema. Left lower leg: No edema. Lymphadenopathy:      Cervical: No cervical adenopathy. Skin:     General: Skin is warm. Capillary Refill: Capillary refill takes 2 to 3 seconds. Coloration: Skin is not jaundiced or pale. Findings: No bruising, erythema, lesion or rash. Neurological:      General: No focal deficit present. Mental Status: She is alert and oriented to person, place, and time. Cranial Nerves: No cranial nerve deficit. Sensory: No sensory deficit. Motor: No weakness. Coordination: Coordination normal.      Gait: Gait normal.      Deep Tendon Reflexes: Reflexes normal.   Psychiatric:         Mood and Affect: Mood normal.         Behavior: Behavior normal.         Thought Content:  Thought content normal.         Judgment: Judgment normal.             Results for orders placed or performed in visit on 12/17/20   AMB POC URINALYSIS DIP STICK AUTO W/O MICRO   Result Value Ref Range    Color (UA POC) Yellow     Clarity (UA POC) Clear     Glucose (UA POC) Negative Negative    Bilirubin (UA POC) Negative Negative    Ketones (UA POC) Negative Negative    Specific gravity (UA POC) 1.015 1.001 - 1.035    Blood (UA POC) Trace Negative    pH (UA POC) 5.5 4.6 - 8.0    Protein (UA POC) Negative Negative    Urobilinogen (UA POC) 0.2 mg/dL 0.2 - 1    Nitrites (UA POC) Negative Negative    Leukocyte esterase (UA POC) Negative Negative       Assessment/Plan:    ICD-10-CM ICD-9-CM    1. Mixed hyperlipidemia  E78.2 272.2 LIPID PANEL      METABOLIC PANEL, COMPREHENSIVE   2. Malignant hypertensive heart disease without heart failure  Q90.4 955.36 METABOLIC PANEL, COMPREHENSIVE   3. Gastroesophageal reflux disease with esophagitis without hemorrhage  K21.00 530.81      530.10    4. Lipoma of chest wall  D17.1 214.8     Surgery discouraged-Weight loss counseling effected   5. Overweight (BMI 25.0-29. 9)  E66.3 278.02      No orders of the defined types were placed in this encounter. Cannot display discharge medications since this is not an admission.

## 2021-01-04 NOTE — PATIENT INSTRUCTIONS
Low Sodium Diet (2,000 Milligram): Care Instructions Your Care Instructions Too much sodium causes your body to hold on to extra water. This can raise your blood pressure and force your heart and kidneys to work harder. In very serious cases, this could cause you to be put in the hospital. It might even be life-threatening. By limiting sodium, you will feel better and lower your risk of serious problems. The most common source of sodium is salt. People get most of the salt in their diet from canned, prepared, and packaged foods. Fast food and restaurant meals also are very high in sodium. Your doctor will probably limit your sodium to less than 2,000 milligrams (mg) a day. This limit counts all the sodium in prepared and packaged foods and any salt you add to your food. Follow-up care is a key part of your treatment and safety. Be sure to make and go to all appointments, and call your doctor if you are having problems. It's also a good idea to know your test results and keep a list of the medicines you take. How can you care for yourself at home? Read food labels · Read labels on cans and food packages. The labels tell you how much sodium is in each serving. Make sure that you look at the serving size. If you eat more than the serving size, you have eaten more sodium. · Food labels also tell you the Percent Daily Value for sodium. Choose products with low Percent Daily Values for sodium. · Be aware that sodium can come in forms other than salt, including monosodium glutamate (MSG), sodium citrate, and sodium bicarbonate (baking soda). MSG is often added to Asian food. When you eat out, you can sometimes ask for food without MSG or added salt. Buy low-sodium foods · Buy foods that are labeled \"unsalted\" (no salt added), \"sodium-free\" (less than 5 mg of sodium per serving), or \"low-sodium\" (less than 140 mg of sodium per serving). Foods labeled \"reduced-sodium\" and \"light sodium\" may still have too much sodium. Be sure to read the label to see how much sodium you are getting. · Buy fresh vegetables, or frozen vegetables without added sauces. Buy low-sodium versions of canned vegetables, soups, and other canned goods. Prepare low-sodium meals · Cut back on the amount of salt you use in cooking. This will help you adjust to the taste. Do not add salt after cooking. One teaspoon of salt has about 2,300 mg of sodium. · Take the salt shaker off the table. · Flavor your food with garlic, lemon juice, onion, vinegar, herbs, and spices. Do not use soy sauce, lite soy sauce, steak sauce, onion salt, garlic salt, celery salt, mustard, or ketchup on your food. · Use low-sodium salad dressings, sauces, and ketchup. Or make your own salad dressings and sauces without adding salt. · Use less salt (or none) when recipes call for it. You can often use half the salt a recipe calls for without losing flavor. Other foods such as rice, pasta, and grains do not need added salt. · Rinse canned vegetables, and cook them in fresh water. This removes somebut not allof the salt. · Avoid water that is naturally high in sodium or that has been treated with water softeners, which add sodium. Call your local water company to find out the sodium content of your water supply. If you buy bottled water, read the label and choose a sodium-free brand. Avoid high-sodium foods · Avoid eating: 
? Smoked, cured, salted, and canned meat, fish, and poultry. ? Ham, be, hot dogs, and luncheon meats. ? Regular, hard, and processed cheese and regular peanut butter. ? Crackers with salted tops, and other salted snack foods such as pretzels, chips, and salted popcorn. ? Frozen prepared meals, unless labeled low-sodium. 
? Canned and dried soups, broths, and bouillon, unless labeled sodium-free or low-sodium. 
? Canned vegetables, unless labeled sodium-free or low-sodium. 
? French fries, pizza, tacos, and other fast foods. 
? Pickles, olives, ketchup, and other condiments, especially soy sauce, unless labeled sodium-free or low-sodium. 
Where can you learn more? 
Go to https://www.Private.Me.net/Quiklyonnections 
Enter V843 in the search box to learn more about \"Low Sodium Diet (2,000 Milligram): Care Instructions.\" 
Current as of: August 22, 2019               Content Version: 12.6 
© 6728-3582 [employer].  
Care instructions adapted under license by [employer] (which disclaims liability or warranty for this information). If you have questions about a medical condition or this instruction, always ask your healthcare professional. [employer] disclaims any warranty or liability for your use of this information. 
 
 
  
Learning About Low-Fat Eating 
What is low-fat eating? 
 
Most food has some fat in it. Your body needs some fat to be healthy. But some kinds of fats are healthier than others. 
In a low-fat eating plan, you try to choose healthier fats and eat fewer unhealthy fats. Healthy fats include olive and canola oil. Try to avoid eating too much saturated fat (such as in cheese and meats) and trans fat (a type of fat found in many packaged snack foods and other baked goods). 
You do not need to cut all fat from your diet. But you can make healthier choices about the types and amount of fat you eat. 
Even though it is a good idea to choose healthier fats, it is still important to be careful of how much fat you eat, because all fats are high in calories. 
What are the different types of fats? 
Unhealthy fat 
 · Saturated fat. Saturated fats are mostly in animal foods, such as meat and dairy foods. Tropical oils, such as coconut oil, palm oil, and cocoa butter, are also saturated fats. Healthy fats · Monounsaturated fat. Monounsaturated fats are liquid at room temperature but get solid when refrigerated. Eating foods that are high in this fat may help lower your \"bad\" (LDL) cholesterol, keep your \"good\" (HDL) cholesterol level up, and lower your chances of getting coronary artery disease. This fat is found in canola oil, olive oil, peanut oil, olives, avocados, nuts, and nut butters. · Polyunsaturated fat. Polyunsaturated fats are liquid at room temperature. They are in safflower, sunflower, and corn oils. They are also the main fat in seafood. Omega-3 fatty acids are types of polyunsaturated fat. Eating fish may lower your chances of getting coronary artery disease. Fatty fish such as salmon and mackerel contain these healthy fatty acids. So do ground flaxseeds and flaxseed oil, soybeans, walnuts, and seeds. Why cut down on unhealthy fats? Eating foods that contain saturated fats can raise the LDL (\"bad\") cholesterol in your blood. Having a high level of LDL cholesterol increases your chance of hardening of the arteries (atherosclerosis), which can lead to heart disease, heart attack, and stroke. Trans fat raises the level of \"bad\" LDL cholesterol in your blood and may lower the \"good\" HDL cholesterol in your blood. Trans fat can raise your risk of heart disease, heart attack, and stroke. In general: · No more than 10% of your daily calories should come from saturated fat. This is about 20 grams in a 2,000-calorie diet. · No more than 10% of your daily calories should come from polyunsaturated fat. This is about 20 grams in a 2,000-calorie diet. · Monounsaturated fats can be up to 15% of your daily calories. This is about 25 to 30 grams in a 2,000-calorie diet. If you're not sure how much fat you should be eating or how many calories you need each day to stay at a healthy weight, talk to a registered dietitian. He or she can help you create a plan that's right for you. What can you do to cut down on fat? Foods like cheese, butter, sausage, and desserts can have a lot of unhealthy fats. Try these tips for healthier meals at home and when you eat out. At home · Fill up on fruits, vegetables, and whole grains. · Think of meat as a side dish instead of as the main part of your meal. 
· When you do eat meat, make it extra-lean ground beef (97% lean), ground turkey breast (without skin added), meats with fat trimmed off before cooking, or skinless chicken. · Try main dishes that use whole wheat pasta, brown rice, dried beans, or vegetables. · Use cooking methods that use little or no fat, such as broiling, steaming, or grilling. Use cooking spray instead of oil. If you use oil, use a monounsaturated oil, such as canola or olive oil. · Read food labels on canned, bottled, or packaged foods. Choose those with little saturated fat and no trans fat. When eating out at a restaurant · Order foods that are broiled or poached instead of fried or breaded. · Cut back on the amount of butter or margarine that you use on bread. Use small amounts of olive oil instead. · Order sauces, gravies, and salad dressings on the side, and use only a little. · When you order pasta, choose tomato sauce instead of cream sauce. · Ask for salsa with your baked potato instead of sour cream, butter, cheese, or be. Where can you learn more? Go to http://www.gray.com/ Enter Y309 in the search box to learn more about \"Learning About Low-Fat Eating. \" Current as of: August 22, 2019               Content Version: 12.6 © 2607-8727 [employer]. Care instructions adapted under license by [employer] (which disclaims liability or warranty for this information). If you have questions about a medical condition or this instruction, always ask your healthcare professional. [address] any warranty or liability for your use of this information.

## 2021-01-24 RX ORDER — PRAVASTATIN SODIUM 10 MG/1
TABLET ORAL
Qty: 60 TAB | Refills: 0 | Status: SHIPPED | OUTPATIENT
Start: 2021-01-24 | End: 2021-06-23 | Stop reason: SDUPTHER

## 2021-01-24 RX ORDER — AMLODIPINE BESYLATE 5 MG/1
TABLET ORAL
Qty: 30 TAB | Refills: 2 | Status: SHIPPED | OUTPATIENT
Start: 2021-01-24 | End: 2021-05-12

## 2021-03-25 LAB
ALBUMIN SERPL-MCNC: 3.9 G/DL (ref 3.8–4.9)
ALBUMIN/GLOB SERPL: 1.3 {RATIO} (ref 1.2–2.2)
ALP SERPL-CCNC: 81 IU/L (ref 39–117)
ALT SERPL-CCNC: 8 IU/L (ref 0–32)
AST SERPL-CCNC: 14 IU/L (ref 0–40)
BILIRUB SERPL-MCNC: 0.5 MG/DL (ref 0–1.2)
BUN SERPL-MCNC: 10 MG/DL (ref 6–24)
BUN/CREAT SERPL: 14 (ref 9–23)
CALCIUM SERPL-MCNC: 9.2 MG/DL (ref 8.7–10.2)
CHLORIDE SERPL-SCNC: 105 MMOL/L (ref 96–106)
CHOLEST SERPL-MCNC: 200 MG/DL (ref 100–199)
CO2 SERPL-SCNC: 22 MMOL/L (ref 20–29)
CREAT SERPL-MCNC: 0.72 MG/DL (ref 0.57–1)
GLOBULIN SER CALC-MCNC: 2.9 G/DL (ref 1.5–4.5)
GLUCOSE SERPL-MCNC: 84 MG/DL (ref 65–99)
HDLC SERPL-MCNC: 66 MG/DL
LDLC SERPL CALC-MCNC: 119 MG/DL (ref 0–99)
POTASSIUM SERPL-SCNC: 4.4 MMOL/L (ref 3.5–5.2)
PROT SERPL-MCNC: 6.8 G/DL (ref 6–8.5)
SODIUM SERPL-SCNC: 139 MMOL/L (ref 134–144)
TRIGL SERPL-MCNC: 81 MG/DL (ref 0–149)
VLDLC SERPL CALC-MCNC: 15 MG/DL (ref 5–40)

## 2021-03-29 ENCOUNTER — OFFICE VISIT (OUTPATIENT)
Dept: FAMILY MEDICINE CLINIC | Age: 56
End: 2021-03-29
Payer: COMMERCIAL

## 2021-03-29 VITALS
SYSTOLIC BLOOD PRESSURE: 140 MMHG | RESPIRATION RATE: 16 BRPM | OXYGEN SATURATION: 98 % | HEART RATE: 89 BPM | WEIGHT: 156.2 LBS | HEIGHT: 62 IN | DIASTOLIC BLOOD PRESSURE: 90 MMHG | BODY MASS INDEX: 28.74 KG/M2

## 2021-03-29 DIAGNOSIS — K21.00 GASTROESOPHAGEAL REFLUX DISEASE WITH ESOPHAGITIS WITHOUT HEMORRHAGE: ICD-10-CM

## 2021-03-29 DIAGNOSIS — I11.9 MALIGNANT HYPERTENSIVE HEART DISEASE WITHOUT HEART FAILURE: Primary | ICD-10-CM

## 2021-03-29 DIAGNOSIS — E78.2 MIXED HYPERLIPIDEMIA: ICD-10-CM

## 2021-03-29 DIAGNOSIS — R05.8 NOCTURNAL COUGH: ICD-10-CM

## 2021-03-29 PROCEDURE — 99214 OFFICE O/P EST MOD 30 MIN: CPT | Performed by: FAMILY MEDICINE

## 2021-03-29 NOTE — PROGRESS NOTES
Re Magana is a 54 y.o. female and presents with Cholesterol Problem, Hypertension, and Follow-up (Pt states she feel as if something in throat )  . HPI     Subjective:  Cardiovascular Review:  The patient has hypertension   Diet and Lifestyle: generally follows a low fat low cholesterol diet, generally follows a low sodium diet, exercises sporadically  Home BP Monitoring: is not measured at home. Pertinent ROS: taking medications as instructed, no medication side effects noted, no TIA's, no chest pain on exertion, no dyspnea on exertion, no swelling of ankles. Review of Systems  Review of Systems   Constitutional: Negative. Negative for chills and fever. HENT: Negative. Negative for congestion, ear discharge, hearing loss, nosebleeds and tinnitus. Eyes: Negative. Negative for blurred vision, double vision, photophobia and pain. Respiratory: Negative. Negative for cough, hemoptysis and sputum production. Cardiovascular: Negative. Negative for chest pain and palpitations. Gastrointestinal: Negative. Negative for heartburn, nausea and vomiting. Genitourinary: Negative. Negative for dysuria, frequency and urgency. Musculoskeletal: Negative. Negative for back pain and myalgias. Skin: Negative. Neurological: Negative. Negative for dizziness, tingling, weakness and headaches. Endo/Heme/Allergies: Negative. Psychiatric/Behavioral: Negative. Negative for depression and suicidal ideas. The patient does not have insomnia. All other systems reviewed and are negative.         Past Medical History:   Diagnosis Date    Allergies     GERD (gastroesophageal reflux disease)     Headache     History of throat problem     Hypercholesterolemia     Hypertension     Sinus problem      Past Surgical History:   Procedure Laterality Date    HX GYN      HX ORTHOPAEDIC Left 2001    knee    HX PARTIAL HYSTERECTOMY      HX TUBAL LIGATION  1993     Social History     Socioeconomic History    Marital status:      Spouse name: Not on file    Number of children: Not on file    Years of education: Not on file    Highest education level: Not on file   Tobacco Use    Smoking status: Never Smoker    Smokeless tobacco: Never Used   Substance and Sexual Activity    Alcohol use: Yes     Frequency: Monthly or less     Comment: wine occ     Drug use: Never     Family History   Problem Relation Age of Onset    Hypertension Father      Current Outpatient Medications   Medication Sig Dispense Refill    pravastatin (PRAVACHOL) 10 mg tablet TAKE 1 TABLET BY MOUTH EVERY NIGHT 60 Tab 0    amLODIPine (NORVASC) 5 mg tablet TAKE 1 TABLET BY MOUTH DAILY 30 Tab 2    acetaminophen (TYLENOL) 500 mg tablet Take  by mouth every six (6) hours as needed for Pain.  naproxen (NAPROSYN) 500 mg tablet Take 1 Tab by mouth two (2) times daily (with meals). 30 Tab 1     Allergies   Allergen Reactions    Cat Dander Other (comments)     Not allergic     Dog Dander Other (comments)     Not allergic     Pcn [Penicillins] Other (comments)       Objective:  Visit Vitals  BP (!) 140/90 (BP 1 Location: Left upper arm, BP Patient Position: Sitting, BP Cuff Size: Adult)   Pulse 89   Resp 16   Ht 5' 2\" (1.575 m)   Wt 156 lb 3.2 oz (70.9 kg)   LMP  (LMP Unknown) Comment: partial hysterectomy   SpO2 98%   BMI 28.57 kg/m²       Physical Exam:   Physical Exam  Vitals signs and nursing note reviewed. Constitutional:       Appearance: Normal appearance. She is obese. HENT:      Head: Normocephalic and atraumatic. Right Ear: Tympanic membrane, ear canal and external ear normal.      Left Ear: Tympanic membrane, ear canal and external ear normal.      Nose: Nose normal.      Mouth/Throat:      Mouth: Mucous membranes are moist.      Pharynx: Oropharynx is clear. No oropharyngeal exudate or posterior oropharyngeal erythema. Eyes:      General: No scleral icterus. Right eye: No discharge.          Left eye: No discharge. Extraocular Movements: Extraocular movements intact. Conjunctiva/sclera: Conjunctivae normal.      Pupils: Pupils are equal, round, and reactive to light. Neck:      Musculoskeletal: Normal range of motion and neck supple. No neck rigidity or muscular tenderness. Vascular: No carotid bruit. Cardiovascular:      Rate and Rhythm: Normal rate. Pulses: Normal pulses. Heart sounds: Normal heart sounds. No murmur. No gallop. Pulmonary:      Effort: Pulmonary effort is normal. No respiratory distress. Breath sounds: Normal breath sounds. No stridor. No wheezing, rhonchi or rales. Chest:      Chest wall: No tenderness. Abdominal:      General: Bowel sounds are normal. There is no distension. Palpations: Abdomen is soft. There is no mass. Tenderness: There is no abdominal tenderness. There is no right CVA tenderness, left CVA tenderness or rebound. Hernia: No hernia is present. Musculoskeletal: Normal range of motion. General: No swelling, tenderness, deformity or signs of injury. Right lower leg: No edema. Left lower leg: No edema. Lymphadenopathy:      Cervical: No cervical adenopathy. Skin:     General: Skin is warm. Capillary Refill: Capillary refill takes 2 to 3 seconds. Coloration: Skin is not jaundiced or pale. Findings: No bruising, erythema, lesion or rash. Neurological:      General: No focal deficit present. Mental Status: She is alert and oriented to person, place, and time. Cranial Nerves: No cranial nerve deficit. Sensory: No sensory deficit. Motor: No weakness. Coordination: Coordination normal.      Gait: Gait normal.      Deep Tendon Reflexes: Reflexes normal.   Psychiatric:         Mood and Affect: Mood normal.         Behavior: Behavior normal.         Thought Content:  Thought content normal.         Judgment: Judgment normal.             Results for orders placed or performed in visit on 01/04/21   LIPID PANEL   Result Value Ref Range    Cholesterol, total 200 (H) 100 - 199 mg/dL    Triglyceride 81 0 - 149 mg/dL    HDL Cholesterol 66 >39 mg/dL    VLDL, calculated 15 5 - 40 mg/dL    LDL, calculated 119 (H) 0 - 99 mg/dL   METABOLIC PANEL, COMPREHENSIVE   Result Value Ref Range    Glucose 84 65 - 99 mg/dL    BUN 10 6 - 24 mg/dL    Creatinine 0.72 0.57 - 1.00 mg/dL    GFR est non-AA 95 >59 mL/min/1.73    GFR est  >59 mL/min/1.73    BUN/Creatinine ratio 14 9 - 23    Sodium 139 134 - 144 mmol/L    Potassium 4.4 3.5 - 5.2 mmol/L    Chloride 105 96 - 106 mmol/L    CO2 22 20 - 29 mmol/L    Calcium 9.2 8.7 - 10.2 mg/dL    Protein, total 6.8 6.0 - 8.5 g/dL    Albumin 3.9 3.8 - 4.9 g/dL    GLOBULIN, TOTAL 2.9 1.5 - 4.5 g/dL    A-G Ratio 1.3 1.2 - 2.2    Bilirubin, total 0.5 0.0 - 1.2 mg/dL    Alk. phosphatase 81 39 - 117 IU/L    AST (SGOT) 14 0 - 40 IU/L    ALT (SGPT) 8 0 - 32 IU/L     Labs reviewed-all normal except elevated LDL of 119-patient to start taking staitin med rmore regularly  Assessment/Plan:    ICD-10-CM ICD-9-CM    1. Malignant hypertensive heart disease without heart failure  I11.9 402.00    2. Gastroesophageal reflux disease with esophagitis without hemorrhage  K21.00 530.81      530.10    3. Nocturnal cough  R05 786.2     Patient to discontinue sleeping flat which is her current preference   4. Mixed hyperlipidemia  E78.2 272.2      No orders of the defined types were placed in this encounter. Cannot display discharge medications since this is not an admission.

## 2021-04-05 ENCOUNTER — OFFICE VISIT (OUTPATIENT)
Dept: FAMILY MEDICINE CLINIC | Age: 56
End: 2021-04-05
Payer: COMMERCIAL

## 2021-04-05 VITALS
RESPIRATION RATE: 16 BRPM | HEIGHT: 62 IN | HEART RATE: 95 BPM | BODY MASS INDEX: 28.52 KG/M2 | OXYGEN SATURATION: 99 % | DIASTOLIC BLOOD PRESSURE: 82 MMHG | WEIGHT: 155 LBS | SYSTOLIC BLOOD PRESSURE: 146 MMHG

## 2021-04-05 DIAGNOSIS — S30.1XXA CONTUSION OF ABDOMINAL WALL, INITIAL ENCOUNTER: ICD-10-CM
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
## 2021-04-05 DIAGNOSIS — M54.6 ACUTE MIDLINE THORACIC BACK PAIN: ICD-10-CM

## 2021-04-05 PROCEDURE — 99213 OFFICE O/P EST LOW 20 MIN: CPT | Performed by: FAMILY MEDICINE

## 2021-04-05 RX ORDER — KETOROLAC TROMETHAMINE 10 MG/1
10 TABLET, FILM COATED ORAL
Qty: 40 TAB | Refills: 0 | Status: SHIPPED | OUTPATIENT
Start: 2021-04-05 | End: 2021-04-15

## 2021-04-05 NOTE — PATIENT INSTRUCTIONS
[address] April 5, 2021 RE: Henry Junior To Whom It May Concern, This is to certify that Henry Junior may may return to work on 4/9/2021. Please feel free to contact my office if you have any questions or concerns. Thank you for your assistance in this matter. Sincerely, Dorothy Dodge MD

## 2021-04-05 NOTE — PROGRESS NOTES
HPI    Breana Simon is a 54 y.o. female and presents today for Fall (3/30/21 pt had a fall at work and fell on her left side of her body , pt has black and blue bruises on her abdomen , back and left arm , pt  went to [facility] was prescirbed  oxycodone. Pt also had ct scan and xrays done and was told nothing was broken )  . HPI     53 yo AAF s/p fall at work on 3/30/2021 at work while fixing a monitor and standing on a table. c/o left lower abdominal joycelyn and mid back pain 8/10 in intensity with drowsiness and pruritis after taking percocet  Allergies    Allergies   Allergen Reactions    Cat Dander Other (comments)     Not allergic     Dog Dander Other (comments)     Not allergic     Pcn [Penicillins] Other (comments)        Medications    Current Outpatient Medications   Medication Sig Dispense    pravastatin (PRAVACHOL) 10 mg tablet TAKE 1 TABLET BY MOUTH EVERY NIGHT 60 Tab    amLODIPine (NORVASC) 5 mg tablet TAKE 1 TABLET BY MOUTH DAILY 30 Tab    acetaminophen (TYLENOL) 500 mg tablet Take  by mouth every six (6) hours as needed for Pain.  naproxen (NAPROSYN) 500 mg tablet Take 1 Tab by mouth two (2) times daily (with meals). 30 Tab     No current facility-administered medications for this visit.          Health Maintenance    Health Maintenance Due   Topic Date Due    Hepatitis C Screening  Never done    DTaP/Tdap/Td series (1 - Tdap) Never done    Shingrix Vaccine Age 50> (1 of 2) Never done    Colorectal Cancer Screening Combo  Never done        Problem List    Patient Active Problem List    Diagnosis Date Noted    Fall 04/05/2021    Acute midline thoracic back pain 04/05/2021    Contusion of abdominal wall 04/05/2021    Nocturnal cough 03/29/2021    Lipoma of chest wall 01/04/2021    Overweight (BMI 25.0-29.9) 01/04/2021    Epigastric pain 12/17/2020    Left lower quadrant abdominal pain 12/17/2020    Functional diarrhea 12/17/2020    Hx of left knee surgery 10/02/2020    History of arthroplasty of left knee 10/02/2020    Malignant hypertensive heart disease without heart failure 10/02/2020    Body mass index (bmi) 25.0-25.9, adult 08/04/2020    Chronic urticaria 08/04/2020    Degenerative joint disease involving multiple joints 08/04/2020    Essential hypertension 08/04/2020    GERD (gastroesophageal reflux disease) 08/04/2020    Headache 08/04/2020    Injury of thigh 08/04/2020    Mass of right breast 08/04/2020    Mixed hyperlipidemia 08/04/2020    Noncompliance with medication regimen 08/04/2020    Paresthesia 08/04/2020        Family Hx    Family History   Problem Relation Age of Onset    Hypertension Father         Social Hx    Social History     Socioeconomic History    Marital status:      Spouse name: Not on file    Number of children: Not on file    Years of education: Not on file    Highest education level: Not on file   Tobacco Use    Smoking status: Never Smoker    Smokeless tobacco: Never Used   Substance and Sexual Activity    Alcohol use: Yes     Frequency: Monthly or less     Comment: wine occ     Drug use: Never        Surgical Hx    Past Surgical History:   Procedure Laterality Date    HX GYN      HX ORTHOPAEDIC Left 2001    knee    HX PARTIAL HYSTERECTOMY      HX TUBAL LIGATION  1993          Vitals    Visit Vitals  BP (!) 146/82 (BP 1 Location: Right arm, BP Patient Position: Sitting, BP Cuff Size: Adult)   Pulse 95   Resp 16   Ht 5' 2\" (1.575 m)   Wt 155 lb (70.3 kg)   LMP  (LMP Unknown) Comment: partial hysterectomy   SpO2 99%   BMI 28.35 kg/m²        ROS    Review of Systems   Constitutional: Negative. Negative for chills and fever. HENT: Negative. Negative for congestion, ear discharge, hearing loss, nosebleeds and tinnitus. Eyes: Negative. Negative for blurred vision, double vision, photophobia and pain. Respiratory: Negative. Negative for cough, hemoptysis and sputum production. Cardiovascular: Negative.   Negative for chest pain and palpitations. Gastrointestinal: Negative. Negative for heartburn, nausea and vomiting. Genitourinary: Negative. Negative for dysuria, frequency and urgency. Musculoskeletal: Negative. Negative for back pain and myalgias. Skin: Negative. Neurological: Negative. Negative for dizziness, tingling, weakness and headaches. Endo/Heme/Allergies: Negative. Psychiatric/Behavioral: Negative. Negative for depression and suicidal ideas. The patient does not have insomnia. All other systems reviewed and are negative. Physical Exam      Physical Exam  Vitals signs and nursing note reviewed. Constitutional:       Appearance: Normal appearance. She is obese. HENT:      Head: Normocephalic and atraumatic. Right Ear: Tympanic membrane, ear canal and external ear normal.      Left Ear: Tympanic membrane, ear canal and external ear normal.      Nose: Nose normal.      Mouth/Throat:      Mouth: Mucous membranes are moist.      Pharynx: Oropharynx is clear. No oropharyngeal exudate or posterior oropharyngeal erythema. Eyes:      General: No scleral icterus. Right eye: No discharge. Left eye: No discharge. Extraocular Movements: Extraocular movements intact. Conjunctiva/sclera: Conjunctivae normal.      Pupils: Pupils are equal, round, and reactive to light. Neck:      Musculoskeletal: Normal range of motion and neck supple. No neck rigidity or muscular tenderness. Vascular: No carotid bruit. Cardiovascular:      Rate and Rhythm: Normal rate. Pulses: Normal pulses. Heart sounds: Normal heart sounds. No murmur. No gallop. Pulmonary:      Effort: Pulmonary effort is normal. No respiratory distress. Breath sounds: Normal breath sounds. No stridor. No wheezing, rhonchi or rales. Chest:      Chest wall: No tenderness. Abdominal:      General: Bowel sounds are normal. There is no distension. Palpations: Abdomen is soft.  There is no mass. Tenderness: There is no abdominal tenderness (LLQ with ecchimosis). There is no right CVA tenderness, left CVA tenderness or rebound. Hernia: No hernia is present. Musculoskeletal: Normal range of motion. General: Tenderness (left upper and mid lumbar at L4-%) present. No swelling, deformity or signs of injury. Right lower leg: No edema. Left lower leg: No edema. Lymphadenopathy:      Cervical: No cervical adenopathy. Skin:     General: Skin is warm. Capillary Refill: Capillary refill takes 2 to 3 seconds. Coloration: Skin is not jaundiced or pale. Findings: No bruising, erythema, lesion or rash. Neurological:      General: No focal deficit present. Mental Status: She is alert and oriented to person, place, and time. Cranial Nerves: No cranial nerve deficit. Sensory: No sensory deficit. Motor: No weakness. Coordination: Coordination normal.      Gait: Gait normal.      Deep Tendon Reflexes: Reflexes normal.   Psychiatric:         Mood and Affect: Mood normal.         Behavior: Behavior normal.         Thought Content: Thought content normal.         Judgment: Judgment normal.          Assessment/Plan    Diagnoses and all orders for this visit:    1. Fall, initial encounter    2. Acute midline thoracic back pain    3. Contusion of abdominal wall, initial encounter         Health Maintenance Items reviewed with patient as noted.

## 2021-05-12 RX ORDER — AMLODIPINE BESYLATE 5 MG/1
TABLET ORAL
Qty: 30 TAB | Refills: 2 | Status: SHIPPED | OUTPATIENT
Start: 2021-05-12 | End: 2021-07-05

## 2021-06-23 ENCOUNTER — OFFICE VISIT (OUTPATIENT)
Dept: FAMILY MEDICINE CLINIC | Age: 56
End: 2021-06-23
Payer: COMMERCIAL

## 2021-06-23 VITALS
RESPIRATION RATE: 18 BRPM | TEMPERATURE: 97.5 F | HEIGHT: 62 IN | SYSTOLIC BLOOD PRESSURE: 122 MMHG | BODY MASS INDEX: 27.05 KG/M2 | WEIGHT: 147 LBS | DIASTOLIC BLOOD PRESSURE: 72 MMHG | OXYGEN SATURATION: 96 % | HEART RATE: 86 BPM

## 2021-06-23 DIAGNOSIS — I11.9 MALIGNANT HYPERTENSIVE HEART DISEASE WITHOUT HEART FAILURE: Primary | ICD-10-CM

## 2021-06-23 DIAGNOSIS — K21.00 GASTROESOPHAGEAL REFLUX DISEASE WITH ESOPHAGITIS WITHOUT HEMORRHAGE: ICD-10-CM

## 2021-06-23 DIAGNOSIS — E78.2 MIXED HYPERLIPIDEMIA: ICD-10-CM

## 2021-06-23 DIAGNOSIS — M15.9 PRIMARY OSTEOARTHRITIS INVOLVING MULTIPLE JOINTS: ICD-10-CM

## 2021-06-23 DIAGNOSIS — Z12.31 BREAST CANCER SCREENING BY MAMMOGRAM: ICD-10-CM

## 2021-06-23 PROCEDURE — 99214 OFFICE O/P EST MOD 30 MIN: CPT | Performed by: FAMILY MEDICINE

## 2021-06-23 RX ORDER — PRAVASTATIN SODIUM 10 MG/1
10 TABLET ORAL DAILY
Qty: 90 TABLET | Refills: 0 | Status: SHIPPED | OUTPATIENT
Start: 2021-06-23 | End: 2021-10-06

## 2021-06-23 NOTE — PROGRESS NOTES
1. Have you been to the ER, urgent care clinic since your last visit? Hospitalized since your last visit? No    2. Have you seen or consulted any other health care providers outside of the [address] [place] [address] since your last visit? Include any pap smears or colon screening.  No    Visit Vitals  /72 (BP 1 Location: Right arm, BP Patient Position: Sitting, BP Cuff Size: Adult)   Pulse 86   Temp 97.5 °F (36.4 °C) (Temporal)   Resp 18   Ht 5' 2\" (1.575 m)   Wt 147 lb (66.7 kg)   LMP  (LMP Unknown) Comment: partial hysterectomy   SpO2 96%   BMI 26.89 kg/m²     Chief Complaint   Patient presents with    Medication Refill     need her meds refill pt need pravastatin

## 2021-06-23 NOTE — PROGRESS NOTES
Henry Junior is a 54 y.o. female and presents with Medication Refill (need her meds refill pt need pravastatin)  . HPI   53 yo AAFF with a hx of HTN and Hyperlipidemia here on follow up who states she is now taking her statin medication. Pt c/o 2 week hx of menopausal flushing  Subjective:  Cardiovascular Review:  The patient has hypertension   Diet and Lifestyle: generally follows a low fat low cholesterol diet, generally follows a low sodium diet, exercises sporadically  Home BP Monitoring: is not measured at home. Pertinent ROS: taking medications as instructed, no medication side effects noted, no TIA's, no chest pain on exertion, no dyspnea on exertion, no swelling of ankles. Review of Systems  Review of Systems   Constitutional: Negative. Negative for chills and fever. HENT: Negative. Negative for congestion, ear discharge, hearing loss, nosebleeds and tinnitus. Eyes: Negative. Negative for blurred vision, double vision, photophobia and pain. Respiratory: Negative. Negative for cough, hemoptysis and sputum production. Cardiovascular: Negative. Negative for chest pain and palpitations. Gastrointestinal: Negative. Negative for heartburn, nausea and vomiting. Genitourinary: Negative. Negative for dysuria, frequency and urgency. Musculoskeletal: Negative. Negative for back pain and myalgias. Skin: Negative. Neurological: Negative. Negative for dizziness, tingling, weakness and headaches. Endo/Heme/Allergies: Positive for environmental allergies. Psychiatric/Behavioral: Negative. Negative for depression and suicidal ideas. The patient does not have insomnia. All other systems reviewed and are negative.         Past Medical History:   Diagnosis Date    Allergies     Back pain     GERD (gastroesophageal reflux disease)     Headache     History of throat problem     Hypercholesterolemia     Hypertension     Sinus problem      Past Surgical History:   Procedure Laterality Date    HX GYN      HX ORTHOPAEDIC Left 2001    knee    HX PARTIAL HYSTERECTOMY      HX TUBAL LIGATION  1993     Social History     Socioeconomic History    Marital status:      Spouse name: Not on file    Number of children: Not on file    Years of education: Not on file    Highest education level: Not on file   Tobacco Use    Smoking status: Never Smoker    Smokeless tobacco: Never Used   Vaping Use    Vaping Use: Never used   Substance and Sexual Activity    Alcohol use: Yes     Comment: wine occ     Drug use: Never     Social Determinants of Health     Financial Resource Strain:     Difficulty of Paying Living Expenses:    Food Insecurity:     Worried About Running Out of Food in the Last Year:     Ran Out of Food in the Last Year:    Transportation Needs:     Lack of Transportation (Medical):  Lack of Transportation (Non-Medical):    Physical Activity:     Days of Exercise per Week:     Minutes of Exercise per Session:    Stress:     Feeling of Stress :    Social Connections:     Frequency of Communication with Friends and Family:     Frequency of Social Gatherings with Friends and Family:     Attends Congregational Services:     Active Member of Clubs or Organizations:     Attends Club or Organization Meetings:     Marital Status:      Family History   Problem Relation Age of Onset    Hypertension Father      Current Outpatient Medications   Medication Sig Dispense Refill    amLODIPine (NORVASC) 5 mg tablet TAKE 1 TABLET BY MOUTH DAILY 30 Tab 2    pravastatin (PRAVACHOL) 10 mg tablet TAKE 1 TABLET BY MOUTH EVERY NIGHT 60 Tab 0    naproxen (NAPROSYN) 500 mg tablet Take 1 Tab by mouth two (2) times daily (with meals). 30 Tab 1    acetaminophen (TYLENOL) 500 mg tablet Take  by mouth every six (6) hours as needed for Pain.        Allergies   Allergen Reactions    Cat Dander Other (comments)     Not allergic     Dog Dander Other (comments)     Not allergic     Pcn [Penicillins] Other (comments)    Percocet [Oxycodone-Acetaminophen] Itching       Objective:  Visit Vitals  /72 (BP 1 Location: Right arm, BP Patient Position: Sitting, BP Cuff Size: Adult)   Pulse 86   Temp 97.5 °F (36.4 °C) (Temporal)   Resp 18   Ht 5' 2\" (1.575 m)   Wt 147 lb (66.7 kg)   LMP  (LMP Unknown) Comment: partial hysterectomy   SpO2 96%   BMI 26.89 kg/m²       Physical Exam:   Physical Exam  Vitals and nursing note reviewed. Constitutional:       Appearance: Normal appearance. She is obese. HENT:      Head: Normocephalic and atraumatic. Right Ear: Tympanic membrane, ear canal and external ear normal.      Left Ear: Tympanic membrane, ear canal and external ear normal.      Nose: Nose normal.      Mouth/Throat:      Mouth: Mucous membranes are moist.      Pharynx: Oropharynx is clear. No oropharyngeal exudate or posterior oropharyngeal erythema. Eyes:      General: No scleral icterus. Right eye: No discharge. Left eye: No discharge. Extraocular Movements: Extraocular movements intact. Conjunctiva/sclera: Conjunctivae normal.      Pupils: Pupils are equal, round, and reactive to light. Neck:      Vascular: No carotid bruit. Cardiovascular:      Rate and Rhythm: Normal rate. Pulses: Normal pulses. Heart sounds: Normal heart sounds. No murmur heard. No gallop. Pulmonary:      Effort: Pulmonary effort is normal. No respiratory distress. Breath sounds: Normal breath sounds. No stridor. No wheezing, rhonchi or rales. Chest:      Chest wall: No tenderness. Abdominal:      General: Bowel sounds are normal. There is no distension. Palpations: Abdomen is soft. There is no mass. Tenderness: There is no abdominal tenderness. There is no right CVA tenderness, left CVA tenderness or rebound. Hernia: No hernia is present. Musculoskeletal:         General: No swelling, tenderness, deformity or signs of injury.  Normal range of motion. Cervical back: Normal range of motion and neck supple. No rigidity. No muscular tenderness. Right lower leg: No edema. Left lower leg: No edema. Lymphadenopathy:      Cervical: No cervical adenopathy. Skin:     General: Skin is warm. Capillary Refill: Capillary refill takes 2 to 3 seconds. Coloration: Skin is not jaundiced or pale. Findings: No bruising, erythema, lesion or rash. Neurological:      General: No focal deficit present. Mental Status: She is alert and oriented to person, place, and time. Cranial Nerves: No cranial nerve deficit. Sensory: No sensory deficit. Motor: No weakness. Coordination: Coordination normal.      Gait: Gait normal.      Deep Tendon Reflexes: Reflexes normal.   Psychiatric:         Mood and Affect: Mood normal.         Behavior: Behavior normal.         Thought Content: Thought content normal.         Judgment: Judgment normal.             Results for orders placed or performed in visit on 01/04/21   LIPID PANEL   Result Value Ref Range    Cholesterol, total 200 (H) 100 - 199 mg/dL    Triglyceride 81 0 - 149 mg/dL    HDL Cholesterol 66 >39 mg/dL    VLDL, calculated 15 5 - 40 mg/dL    LDL, calculated 119 (H) 0 - 99 mg/dL   METABOLIC PANEL, COMPREHENSIVE   Result Value Ref Range    Glucose 84 65 - 99 mg/dL    BUN 10 6 - 24 mg/dL    Creatinine 0.72 0.57 - 1.00 mg/dL    GFR est non-AA 95 >59 mL/min/1.73    GFR est  >59 mL/min/1.73    BUN/Creatinine ratio 14 9 - 23    Sodium 139 134 - 144 mmol/L    Potassium 4.4 3.5 - 5.2 mmol/L    Chloride 105 96 - 106 mmol/L    CO2 22 20 - 29 mmol/L    Calcium 9.2 8.7 - 10.2 mg/dL    Protein, total 6.8 6.0 - 8.5 g/dL    Albumin 3.9 3.8 - 4.9 g/dL    GLOBULIN, TOTAL 2.9 1.5 - 4.5 g/dL    A-G Ratio 1.3 1.2 - 2.2    Bilirubin, total 0.5 0.0 - 1.2 mg/dL    Alk.  phosphatase 81 39 - 117 IU/L    AST (SGOT) 14 0 - 40 IU/L    ALT (SGPT) 8 0 - 32 IU/L       Assessment/Plan:    ICD-10-CM ICD-9-CM    1. Malignant hypertensive heart disease without heart failure  I11.9 402.00    2. Gastroesophageal reflux disease with esophagitis without hemorrhage  K21.00 530.81      530.10    3. Primary osteoarthritis involving multiple joints  M89.49 715.98    4. Mixed hyperlipidemia  E78.2 272.2      No orders of the defined types were placed in this encounter. Cannot display discharge medications since this is not an admission.

## 2021-07-05 RX ORDER — AMLODIPINE BESYLATE 5 MG/1
TABLET ORAL
Qty: 30 TABLET | Refills: 2 | Status: SHIPPED | OUTPATIENT
Start: 2021-07-05 | End: 2022-02-07 | Stop reason: SDUPTHER

## 2021-07-19 ENCOUNTER — TRANSCRIBE ORDER (OUTPATIENT)
Dept: SCHEDULING | Age: 56
End: 2021-07-19

## 2021-07-19 DIAGNOSIS — Z12.31 SCREENING MAMMOGRAM FOR HIGH-RISK PATIENT: Primary | ICD-10-CM

## 2021-07-27 ENCOUNTER — HOSPITAL ENCOUNTER (OUTPATIENT)
Dept: MAMMOGRAPHY | Age: 56
Discharge: HOME | End: 2021-07-27
Attending: FAMILY MEDICINE
Payer: COMMERCIAL

## 2021-07-27 DIAGNOSIS — Z12.31 SCREENING MAMMOGRAM FOR HIGH-RISK PATIENT: ICD-10-CM

## 2021-07-27 PROCEDURE — 77063 BREAST TOMOSYNTHESIS BI: CPT

## 2021-10-06 RX ORDER — PRAVASTATIN SODIUM 10 MG/1
TABLET ORAL
Qty: 90 TABLET | Refills: 0 | Status: SHIPPED | OUTPATIENT
Start: 2021-10-06 | End: 2021-12-13

## 2021-11-02 ENCOUNTER — OFFICE VISIT (OUTPATIENT)
Dept: FAMILY MEDICINE CLINIC | Age: 56
End: 2021-11-02
Payer: COMMERCIAL

## 2021-11-02 VITALS
WEIGHT: 154 LBS | TEMPERATURE: 97.3 F | HEIGHT: 62 IN | BODY MASS INDEX: 28.34 KG/M2 | SYSTOLIC BLOOD PRESSURE: 136 MMHG | HEART RATE: 70 BPM | DIASTOLIC BLOOD PRESSURE: 88 MMHG | OXYGEN SATURATION: 98 % | RESPIRATION RATE: 14 BRPM

## 2021-11-02 DIAGNOSIS — Z96.652 HISTORY OF ARTHROPLASTY OF LEFT KNEE: ICD-10-CM

## 2021-11-02 DIAGNOSIS — I11.9 MALIGNANT HYPERTENSIVE HEART DISEASE WITHOUT HEART FAILURE: Primary | ICD-10-CM

## 2021-11-02 DIAGNOSIS — K21.00 GASTROESOPHAGEAL REFLUX DISEASE WITH ESOPHAGITIS WITHOUT HEMORRHAGE: ICD-10-CM

## 2021-11-02 DIAGNOSIS — E78.2 MIXED HYPERLIPIDEMIA: ICD-10-CM

## 2021-11-02 DIAGNOSIS — M15.9 PRIMARY OSTEOARTHRITIS INVOLVING MULTIPLE JOINTS: ICD-10-CM

## 2021-11-02 PROCEDURE — 99214 OFFICE O/P EST MOD 30 MIN: CPT | Performed by: FAMILY MEDICINE

## 2021-11-02 NOTE — PROGRESS NOTES
Kathrine Francisco is a 54 y.o. female and presents with Follow-up (3 mo), Cholesterol Problem, and Hypertension  . HPI   55 Yo with a hx of HTN, and GERD here on follow up c/o hot flushes daily lasting    Subjective:  Cardiovascular Review:  The patient has hypertension   Diet and Lifestyle: generally follows a low fat low cholesterol diet, generally follows a low sodium diet, exercises sporadically  Home BP Monitoring: is not measured at home. Pertinent ROS: taking medications as instructed, no medication side effects noted, no TIA's, no chest pain on exertion, no dyspnea on exertion, no swelling of ankles. Review of Systems  Review of Systems   Constitutional: Negative. Negative for chills and fever. HENT: Negative. Negative for congestion, ear discharge, hearing loss, nosebleeds and tinnitus. Eyes: Negative. Negative for blurred vision, double vision, photophobia and pain. Respiratory: Negative. Negative for cough, hemoptysis and sputum production. Cardiovascular: Negative. Negative for chest pain and palpitations. Gastrointestinal: Negative. Negative for heartburn, nausea and vomiting. Genitourinary: Negative. Negative for dysuria, frequency and urgency. Musculoskeletal: Negative. Negative for back pain and myalgias. Skin: Negative. Neurological: Negative. Negative for dizziness, tingling, weakness and headaches. Endo/Heme/Allergies: Negative. Psychiatric/Behavioral: Negative. Negative for depression and suicidal ideas. The patient does not have insomnia. All other systems reviewed and are negative.         Past Medical History:   Diagnosis Date    Allergies     Back pain     GERD (gastroesophageal reflux disease)     Headache     History of throat problem     Hypercholesterolemia     Hypertension     Sinus problem      Past Surgical History:   Procedure Laterality Date    HX GYN      HX ORTHOPAEDIC Left 2001    knee    HX PARTIAL HYSTERECTOMY      HX TUBAL LIGATION  1993     Social History     Socioeconomic History    Marital status:      Spouse name: Not on file    Number of children: Not on file    Years of education: Not on file    Highest education level: Not on file   Tobacco Use    Smoking status: Never Smoker    Smokeless tobacco: Never Used   Vaping Use    Vaping Use: Never used   Substance and Sexual Activity    Alcohol use: Yes     Comment: wine occ     Drug use: Never     Social Determinants of Health     Financial Resource Strain:     Difficulty of Paying Living Expenses:    Food Insecurity:     Worried About Running Out of Food in the Last Year:     [address] in the Last Year:    Transportation Needs:     Lack of Transportation (Medical):  Lack of Transportation (Non-Medical):    Physical Activity:     Days of Exercise per Week:     Minutes of Exercise per Session:    Stress:     Feeling of Stress :    Social Connections:     Frequency of Communication with Friends and Family:     Frequency of Social Gatherings with Friends and Family:     Attends Caodaism Services:     Active Member of Clubs or Organizations:     Attends Club or Organization Meetings:     Marital Status:      Family History   Problem Relation Age of Onset    Hypertension Father     Ovarian Cancer Maternal Aunt      Current Outpatient Medications   Medication Sig Dispense Refill    pravastatin (PRAVACHOL) 10 mg tablet TAKE 1 TABLET BY MOUTH DAILY 90 Tablet 0    amLODIPine (NORVASC) 5 mg tablet TAKE 1 TABLET BY MOUTH DAILY 30 Tablet 2    naproxen (NAPROSYN) 500 mg tablet Take 1 Tab by mouth two (2) times daily (with meals). 30 Tab 1    acetaminophen (TYLENOL) 500 mg tablet Take  by mouth every six (6) hours as needed for Pain.        Allergies   Allergen Reactions    Cat Dander Other (comments)     Not allergic     Dog Dander Other (comments)     Not allergic     Pcn [Penicillins] Other (comments)    Percocet [Oxycodone-Acetaminophen] Itching       Objective:  Visit Vitals  /88 (BP 1 Location: Right upper arm, BP Patient Position: Sitting)   Pulse 70   Temp 97.3 °F (36.3 °C) (Temporal)   Resp 14   Ht 5' 2\" (1.575 m)   Wt 154 lb (69.9 kg)   LMP  (LMP Unknown) Comment: partial hysterectomy   SpO2 98%   BMI 28.17 kg/m²       Physical Exam:   Physical Exam  Vitals and nursing note reviewed. Constitutional:       Appearance: Normal appearance. She is obese. HENT:      Head: Normocephalic and atraumatic. Right Ear: Tympanic membrane, ear canal and external ear normal.      Left Ear: Tympanic membrane, ear canal and external ear normal.      Nose: Nose normal.      Mouth/Throat:      Mouth: Mucous membranes are moist.      Pharynx: Oropharynx is clear. No oropharyngeal exudate or posterior oropharyngeal erythema. Eyes:      General: No scleral icterus. Right eye: No discharge. Left eye: No discharge. Extraocular Movements: Extraocular movements intact. Conjunctiva/sclera: Conjunctivae normal.      Pupils: Pupils are equal, round, and reactive to light. Neck:      Vascular: No carotid bruit. Cardiovascular:      Rate and Rhythm: Normal rate. Pulses: Normal pulses. Heart sounds: Normal heart sounds. No murmur heard. No gallop. Pulmonary:      Effort: Pulmonary effort is normal. No respiratory distress. Breath sounds: Normal breath sounds. No stridor. No wheezing, rhonchi or rales. Chest:      Chest wall: No tenderness. Abdominal:      General: Bowel sounds are normal. There is no distension. Palpations: Abdomen is soft. There is no mass. Tenderness: There is no abdominal tenderness. There is no right CVA tenderness, left CVA tenderness or rebound. Hernia: No hernia is present. Musculoskeletal:         General: No swelling, tenderness, deformity or signs of injury. Normal range of motion. Cervical back: Normal range of motion and neck supple. No rigidity.  No muscular tenderness. Right lower leg: No edema. Left lower leg: No edema. Lymphadenopathy:      Cervical: No cervical adenopathy. Skin:     General: Skin is warm. Capillary Refill: Capillary refill takes 2 to 3 seconds. Coloration: Skin is not jaundiced or pale. Findings: No bruising, erythema, lesion or rash. Neurological:      General: No focal deficit present. Mental Status: She is alert and oriented to person, place, and time. Cranial Nerves: No cranial nerve deficit. Sensory: No sensory deficit. Motor: No weakness. Coordination: Coordination normal.      Gait: Gait normal.      Deep Tendon Reflexes: Reflexes normal.   Psychiatric:         Mood and Affect: Mood normal.         Behavior: Behavior normal.         Thought Content: Thought content normal.         Judgment: Judgment normal.             Results for orders placed or performed in visit on 01/04/21   LIPID PANEL   Result Value Ref Range    Cholesterol, total 200 (H) 100 - 199 mg/dL    Triglyceride 81 0 - 149 mg/dL    HDL Cholesterol 66 >39 mg/dL    VLDL, calculated 15 5 - 40 mg/dL    LDL, calculated 119 (H) 0 - 99 mg/dL   METABOLIC PANEL, COMPREHENSIVE   Result Value Ref Range    Glucose 84 65 - 99 mg/dL    BUN 10 6 - 24 mg/dL    Creatinine 0.72 0.57 - 1.00 mg/dL    GFR est non-AA 95 >59 mL/min/1.73    GFR est  >59 mL/min/1.73    BUN/Creatinine ratio 14 9 - 23    Sodium 139 134 - 144 mmol/L    Potassium 4.4 3.5 - 5.2 mmol/L    Chloride 105 96 - 106 mmol/L    CO2 22 20 - 29 mmol/L    Calcium 9.2 8.7 - 10.2 mg/dL    Protein, total 6.8 6.0 - 8.5 g/dL    Albumin 3.9 3.8 - 4.9 g/dL    GLOBULIN, TOTAL 2.9 1.5 - 4.5 g/dL    A-G Ratio 1.3 1.2 - 2.2    Bilirubin, total 0.5 0.0 - 1.2 mg/dL    Alk.  phosphatase 81 39 - 117 IU/L    AST (SGOT) 14 0 - 40 IU/L    ALT (SGPT) 8 0 - 32 IU/L       Assessment/Plan:    ICD-10-CM ICD-9-CM    1. Malignant hypertensive heart disease without heart failure  I11.9 402.00 2. Gastroesophageal reflux disease with esophagitis without hemorrhage  K21.00 530.81      530.10    3. Primary osteoarthritis involving multiple joints  M89.49 715.98    4. Mixed hyperlipidemia  E78.2 272.2    5. History of arthroplasty of left knee  Z96.652 V43.65      No orders of the defined types were placed in this encounter. Cannot display discharge medications since this is not an admission.

## 2021-11-02 NOTE — PROGRESS NOTES
Donn Jackson is a 54 y.o. female    Chief Complaint   Patient presents with    Follow-up     3 mo    Cholesterol Problem    Hypertension       Health Maintenance Due   Topic Date Due    Hepatitis C Screening  Never done    DTaP/Tdap/Td series (1 - Tdap) Never done    Colorectal Cancer Screening Combo  Never done    Shingrix Vaccine Age 50> (1 of 2) Never done    COVID-19 Vaccine (2 - Pfizer 2-dose series) 04/14/2021    Flu Vaccine (1) Never done       Visit Vitals  /88 (BP 1 Location: Right upper arm, BP Patient Position: Sitting)   Pulse 70   Temp 97.3 °F (36.3 °C) (Temporal)   Resp 14   Ht 5' 2\" (1.575 m)   Wt 154 lb (69.9 kg)   LMP  (LMP Unknown) Comment: partial hysterectomy   SpO2 98%   BMI 28.17 kg/m²       3 most recent PHQ Screens 11/2/2021   Little interest or pleasure in doing things Not at all   Feeling down, depressed, irritable, or hopeless Not at all   Total Score PHQ 2 0       No flowsheet data found. Abuse Screening Questionnaire 11/2/2021   Do you ever feel afraid of your partner? N   Are you in a relationship with someone who physically or mentally threatens you? N   Is it safe for you to go home? Y         1. Have you been to the ER, urgent care clinic since your last visit? Hospitalized since your last visit? yes urgent care 3 weeks ago for virus     2. Have you seen or consulted any other health care providers outside of the [address] [place] [address] since your last visit? Include any pap smears or colon screening. no

## 2021-12-13 RX ORDER — PRAVASTATIN SODIUM 10 MG/1
TABLET ORAL
Qty: 90 TABLET | Refills: 0 | Status: SHIPPED | OUTPATIENT
Start: 2021-12-13 | End: 2022-02-07 | Stop reason: SDUPTHER

## 2022-02-07 ENCOUNTER — OFFICE VISIT (OUTPATIENT)
Dept: FAMILY MEDICINE CLINIC | Age: 57
End: 2022-02-07
Payer: COMMERCIAL

## 2022-02-07 VITALS
HEART RATE: 83 BPM | OXYGEN SATURATION: 100 % | HEIGHT: 62 IN | RESPIRATION RATE: 18 BRPM | TEMPERATURE: 97.5 F | BODY MASS INDEX: 28.45 KG/M2 | WEIGHT: 154.6 LBS | DIASTOLIC BLOOD PRESSURE: 80 MMHG | SYSTOLIC BLOOD PRESSURE: 124 MMHG

## 2022-02-07 DIAGNOSIS — M15.9 PRIMARY OSTEOARTHRITIS INVOLVING MULTIPLE JOINTS: ICD-10-CM

## 2022-02-07 DIAGNOSIS — I11.9 MALIGNANT HYPERTENSIVE HEART DISEASE WITHOUT HEART FAILURE: Primary | ICD-10-CM

## 2022-02-07 DIAGNOSIS — K21.00 GASTROESOPHAGEAL REFLUX DISEASE WITH ESOPHAGITIS WITHOUT HEMORRHAGE: ICD-10-CM

## 2022-02-07 DIAGNOSIS — N95.1 MENOPAUSAL FLUSHING: ICD-10-CM

## 2022-02-07 DIAGNOSIS — E78.2 MIXED HYPERLIPIDEMIA: ICD-10-CM

## 2022-02-07 DIAGNOSIS — B35.1 ONYCHOMYCOSIS: ICD-10-CM

## 2022-02-07 PROCEDURE — 99214 OFFICE O/P EST MOD 30 MIN: CPT | Performed by: FAMILY MEDICINE

## 2022-02-07 RX ORDER — CLOTRIMAZOLE AND BETAMETHASONE DIPROPIONATE 10; .64 MG/G; MG/G
CREAM TOPICAL
Qty: 45 G | Refills: 0 | Status: SHIPPED | OUTPATIENT
Start: 2022-02-07

## 2022-02-07 RX ORDER — AMLODIPINE BESYLATE 5 MG/1
5 TABLET ORAL DAILY
Qty: 30 TABLET | Refills: 2 | Status: SHIPPED | OUTPATIENT
Start: 2022-02-07 | End: 2022-05-09 | Stop reason: SDUPTHER

## 2022-02-07 RX ORDER — PRAVASTATIN SODIUM 10 MG/1
10 TABLET ORAL DAILY
Qty: 90 TABLET | Refills: 0 | Status: SHIPPED | OUTPATIENT
Start: 2022-02-07 | End: 2022-05-09 | Stop reason: DRUGHIGH

## 2022-02-07 NOTE — PATIENT INSTRUCTIONS
Learning About Low-Fat Eating  What is low-fat eating? Most food has some fat in it. Your body needs some fat to be healthy. But some kinds of fats are healthier than others. In a low-fat eating plan, you try to choose healthier fats and eat fewer unhealthy fats. Healthy fats include olive and canola oil. Try to avoid eating too much saturated fat, such as in cheese and meats. You do not need to cut all fat from your diet. But you can make healthier choices about the types and amount of fat you eat. Even though it is a good idea to choose healthier fats, it is still important to be careful of how much fat you eat, because all fats are high in calories. What are the different types of fats? Unhealthy fat  · Saturated fat. Saturated fats are mostly in animal foods, such as meat and dairy foods. Tropical oils, such as coconut oil, palm oil, and cocoa butter, are also saturated fats. Healthy fats  · Monounsaturated fat. Monounsaturated fats are liquid at room temperature but get solid when refrigerated. Eating foods that are high in this fat may help lower your \"bad\" (LDL) cholesterol, keep your \"good\" (HDL) cholesterol level up, and lower your chances of getting coronary artery disease. This fat is found in canola oil, olive oil, peanut oil, olives, avocados, nuts, and nut butters. · Polyunsaturated fat. Polyunsaturated fats are liquid at room temperature. They are in safflower, sunflower, and corn oils. They are also the main fat in seafood. Omega-3 fatty acids are types of polyunsaturated fat. Eating fish may lower your chances of getting coronary artery disease. Fatty fish such as salmon and mackerel contain these healthy fatty acids. So do ground flaxseeds and flaxseed oil, soybeans, walnuts, and seeds. Why cut down on unhealthy fats? Eating foods that contain saturated fats can raise the LDL (\"bad\") cholesterol in your blood.  Having a high level of LDL cholesterol increases your chance of hardening of the arteries (atherosclerosis), which can lead to heart disease, heart attack, and stroke. In general:  · No more than 10% of your daily calories should come from saturated fat. This is about 20 grams in a 2,000-calorie diet. · No more than 10% of your daily calories should come from polyunsaturated fat. This is about 20 grams in a 2,000-calorie diet. · Monounsaturated fats can be up to 15% of your daily calories. This is about 25 to 30 grams in a 2,000-calorie diet. If you're not sure how much fat you should be eating or how many calories you need each day to stay at a healthy weight, talk to a registered dietitian. A dietitian can help you create a plan that's right for you. What can you do to cut down on fat? Foods like cheese, butter, sausage, and desserts can have a lot of unhealthy fats. Try these tips for healthier meals at home and when you eat out. At home  · Fill up on fruits, vegetables, and whole grains. · Think of meat as a side dish instead of as the main part of your meal.  · When you do eat meat, make it extra-lean ground beef (97% lean), ground turkey breast (without skin added), meats with fat trimmed off before cooking, or skinless chicken. · Try main dishes that use whole wheat pasta, brown rice, dried beans, or vegetables. · Use cooking methods that use little or no fat, such as broiling, steaming, or grilling. Use cooking spray instead of oil. If you use oil, use a monounsaturated oil, such as canola or olive oil. · Read food labels on canned, bottled, or packaged foods. Choose those with little saturated fat. When eating out at a restaurant  · Order foods that are broiled or poached instead of fried or breaded. · Cut back on the amount of butter or margarine that you use on bread. Use small amounts of olive oil instead. · Order sauces, gravies, and salad dressings on the side, and use only a little.   · When you order pasta, choose tomato sauce instead of cream sauce.  · Ask for salsa with your baked potato instead of sour cream, butter, cheese, or be. Where can you learn more? Go to http://www.gray.com/  Enter [id number] in the search box to learn more about \"Learning About Low-Fat Eating. \"  Current as of: December 17, 2020               Content Version: 13.0  © 5938-6564 [employer]. Care instructions adapted under license by [employer] (which disclaims liability or warranty for this information). If you have questions about a medical condition or this instruction, always ask your healthcare professional. [place] [address] any warranty or liability for your use of this information.

## 2022-02-07 NOTE — PROGRESS NOTES
Room:     Identified pt with two pt identifiers(name and ). Reviewed record in preparation for visit and have obtained necessary documentation. All patient medications has been reviewed. Chief Complaint   Patient presents with    Follow-up     3 month    Nail Problem       Health Maintenance Due   Topic    Hepatitis C Screening     DTaP/Tdap/Td series (1 - Tdap)    Colorectal Cancer Screening Combo     Shingrix Vaccine Age 50> (1 of 2)    COVID-19 Vaccine (2 - Pfizer 3-dose series)       Vitals:    22 0909   BP: 124/80   Pulse: 83   Resp: 18   Temp: 97.5 °F (36.4 °C)   TempSrc: Temporal   SpO2: 100%   Weight: 154 lb 9.6 oz (70.1 kg)   Height: 5' 2\" (1.575 m)   PainSc:   0 - No pain       4. Have you been to the ER, urgent care clinic since your last visit? Hospitalized since your last visit? No    5. Have you seen or consulted any other health care providers outside of the [address] [place] [address] since your last visit? Include any pap smears or colon screening. No        Patient is accompanied by self I have received verbal consent from [place] to discuss any/all medical information while they are present in the room.

## 2022-02-07 NOTE — PROGRESS NOTES
Estrellita Ann is a 64 y.o. female and presents with Follow-up (3 month) and Nail Problem  . HPI   63 Yo with a hx of HTN,Diabetes and overweight here on follow up c/o toe nail changes with darkening after nail polish removal after 2 to 3 weeks. Admits to intermittent toe pain  Also c/o menopausal flushing with heat to the face and back  Subjective:  Cardiovascular Review:  The patient has hypertension   Diet and Lifestyle: generally follows a low fat low cholesterol diet, generally follows a low sodium diet, exercises sporadically  Home BP Monitoring: is not measured at home. Pertinent ROS: taking medications as instructed, no medication side effects noted, no TIA's, no chest pain on exertion, no dyspnea on exertion, no swelling of ankles. Review of Systems  Review of Systems   Constitutional: Negative. Negative for chills and fever. HENT: Negative. Negative for congestion, ear discharge, hearing loss, nosebleeds and tinnitus. Eyes: Negative. Negative for blurred vision, double vision, photophobia and pain. Respiratory: Negative. Negative for cough, hemoptysis and sputum production. Cardiovascular: Negative. Negative for chest pain and palpitations. Gastrointestinal: Negative. Negative for heartburn, nausea and vomiting. Genitourinary: Negative. Negative for dysuria, frequency and urgency. Musculoskeletal: Negative. Negative for back pain and myalgias. Skin: Positive for rash. Neurological: Negative. Negative for dizziness, tingling, weakness and headaches. Endo/Heme/Allergies: Negative. Psychiatric/Behavioral: Negative. Negative for depression and suicidal ideas. The patient does not have insomnia. All other systems reviewed and are negative.         Past Medical History:   Diagnosis Date    Allergies     Back pain     GERD (gastroesophageal reflux disease)     Headache     History of throat problem     Hypercholesterolemia     Hypertension     Sinus problem Past Surgical History:   Procedure Laterality Date    HX GYN      HX ORTHOPAEDIC Left 2001    knee    HX PARTIAL HYSTERECTOMY      HX TUBAL LIGATION  1993     Social History     Socioeconomic History    Marital status:    Tobacco Use    Smoking status: Never Smoker    Smokeless tobacco: Never Used   Vaping Use    Vaping Use: Never used   Substance and Sexual Activity    Alcohol use: Yes     Comment: wine occ     Drug use: Never     Family History   Problem Relation Age of Onset    Hypertension Father     Ovarian Cancer Maternal Aunt      Current Outpatient Medications   Medication Sig Dispense Refill    pravastatin (PRAVACHOL) 10 mg tablet TAKE 1 TABLET BY MOUTH DAILY 90 Tablet 0    amLODIPine (NORVASC) 5 mg tablet TAKE 1 TABLET BY MOUTH DAILY 30 Tablet 2    naproxen (NAPROSYN) 500 mg tablet Take 1 Tab by mouth two (2) times daily (with meals). (Patient not taking: Reported on 2/7/2022) 30 Tab 1    acetaminophen (TYLENOL) 500 mg tablet Take  by mouth every six (6) hours as needed for Pain. (Patient not taking: Reported on 2/7/2022)       Allergies   Allergen Reactions    Cat Dander Other (comments)     Not allergic     Dog Dander Other (comments)     Not allergic     Pcn [Penicillins] Other (comments)    Percocet [Oxycodone-Acetaminophen] Itching       Objective:  Visit Vitals  /80 (BP 1 Location: Left upper arm, BP Patient Position: Sitting, BP Cuff Size: Adult)   Pulse 83   Temp 97.5 °F (36.4 °C) (Temporal)   Resp 18   Ht 5' 2\" (1.575 m)   Wt 154 lb 9.6 oz (70.1 kg)   LMP  (LMP Unknown) Comment: partial hysterectomy   SpO2 100%   BMI 28.28 kg/m²       Physical Exam:   Physical Exam  Vitals and nursing note reviewed. Constitutional:       Appearance: Normal appearance. She is obese. HENT:      Head: Normocephalic and atraumatic.       Right Ear: Tympanic membrane, ear canal and external ear normal.      Left Ear: Tympanic membrane, ear canal and external ear normal. Nose: Nose normal.      Mouth/Throat:      Mouth: Mucous membranes are moist.      Pharynx: Oropharynx is clear. No oropharyngeal exudate or posterior oropharyngeal erythema. Eyes:      General: No scleral icterus. Right eye: No discharge. Left eye: No discharge. Extraocular Movements: Extraocular movements intact. Conjunctiva/sclera: Conjunctivae normal.      Pupils: Pupils are equal, round, and reactive to light. Neck:      Vascular: No carotid bruit. Cardiovascular:      Rate and Rhythm: Normal rate. Pulses: Normal pulses. Heart sounds: Normal heart sounds. No murmur heard. No gallop. Pulmonary:      Effort: Pulmonary effort is normal. No respiratory distress. Breath sounds: Normal breath sounds. No stridor. No wheezing, rhonchi or rales. Chest:      Chest wall: No tenderness. Abdominal:      General: Bowel sounds are normal. There is no distension. Palpations: Abdomen is soft. There is no mass. Tenderness: There is no abdominal tenderness. There is no right CVA tenderness, left CVA tenderness or rebound. Hernia: No hernia is present. Musculoskeletal:         General: No swelling, tenderness, deformity or signs of injury. Normal range of motion. Cervical back: Normal range of motion and neck supple. No rigidity. No muscular tenderness. Right lower leg: No edema. Left lower leg: No edema. Lymphadenopathy:      Cervical: No cervical adenopathy. Skin:     General: Skin is warm. Capillary Refill: Capillary refill takes 2 to 3 seconds. Coloration: Skin is not jaundiced or pale. Findings: No bruising, erythema, lesion or rash. Comments: Bilateral great toe discoloration   Neurological:      General: No focal deficit present. Mental Status: She is alert and oriented to person, place, and time. Cranial Nerves: No cranial nerve deficit. Sensory: No sensory deficit. Motor: No weakness. Coordination: Coordination normal.      Gait: Gait normal.      Deep Tendon Reflexes: Reflexes normal.   Psychiatric:         Mood and Affect: Mood normal.         Behavior: Behavior normal.         Thought Content: Thought content normal.         Judgment: Judgment normal.             Results for orders placed or performed in visit on 01/04/21   LIPID PANEL   Result Value Ref Range    Cholesterol, total 200 (H) 100 - 199 mg/dL    Triglyceride 81 0 - 149 mg/dL    HDL Cholesterol 66 >39 mg/dL    VLDL, calculated 15 5 - 40 mg/dL    LDL, calculated 119 (H) 0 - 99 mg/dL   METABOLIC PANEL, COMPREHENSIVE   Result Value Ref Range    Glucose 84 65 - 99 mg/dL    BUN 10 6 - 24 mg/dL    Creatinine 0.72 0.57 - 1.00 mg/dL    GFR est non-AA 95 >59 mL/min/1.73    GFR est  >59 mL/min/1.73    BUN/Creatinine ratio 14 9 - 23    Sodium 139 134 - 144 mmol/L    Potassium 4.4 3.5 - 5.2 mmol/L    Chloride 105 96 - 106 mmol/L    CO2 22 20 - 29 mmol/L    Calcium 9.2 8.7 - 10.2 mg/dL    Protein, total 6.8 6.0 - 8.5 g/dL    Albumin 3.9 3.8 - 4.9 g/dL    GLOBULIN, TOTAL 2.9 1.5 - 4.5 g/dL    A-G Ratio 1.3 1.2 - 2.2    Bilirubin, total 0.5 0.0 - 1.2 mg/dL    Alk. phosphatase 81 39 - 117 IU/L    AST (SGOT) 14 0 - 40 IU/L    ALT (SGPT) 8 0 - 32 IU/L       Assessment/Plan:    ICD-10-CM ICD-9-CM    1. Malignant hypertensive heart disease without heart failure  I11.9 402.00 LIPID PANEL      METABOLIC PANEL, COMPREHENSIVE   2. Gastroesophageal reflux disease with esophagitis without hemorrhage  K21.00 530.81      530.10    3. Primary osteoarthritis involving multiple joints  M89.49 715.98    4. Onychomycosis  B35.1 110.1     start lotrisone cream   5. Menopausal flushing  N95.1 627.2     refused treatment for now   6. Mixed hyperlipidemia  E78.2 272.2      No orders of the defined types were placed in this encounter. Cannot display discharge medications since this is not an admission.

## 2022-03-18 PROBLEM — R20.2 PARESTHESIA: Status: ACTIVE | Noted: 2020-08-04

## 2022-03-18 PROBLEM — B35.1 ONYCHOMYCOSIS: Status: ACTIVE | Noted: 2022-02-07
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
## 2022-03-18 PROBLEM — S30.1XXA CONTUSION OF ABDOMINAL WALL: Status: ACTIVE | Noted: 2021-04-05

## 2022-03-18 PROBLEM — D17.1 LIPOMA OF CHEST WALL: Status: ACTIVE | Noted: 2021-01-04

## 2022-03-18 PROBLEM — S79.929A INJURY OF THIGH: Status: ACTIVE | Noted: 2020-08-04

## 2022-03-18 PROBLEM — Z91.148 NONCOMPLIANCE WITH MEDICATION REGIMEN: Status: ACTIVE | Noted: 2020-08-04

## 2022-03-18 PROBLEM — I10 ESSENTIAL HYPERTENSION: Status: ACTIVE | Noted: 2020-08-04

## 2022-03-18 PROBLEM — M54.6 ACUTE MIDLINE THORACIC BACK PAIN: Status: ACTIVE | Noted: 2021-04-05

## 2022-03-19 PROBLEM — Z98.890 HX OF LEFT KNEE SURGERY: Status: ACTIVE | Noted: 2020-10-02

## 2022-03-19 PROBLEM — R10.13 EPIGASTRIC PAIN: Status: ACTIVE | Noted: 2020-12-17

## 2022-03-19 PROBLEM — E66.3 OVERWEIGHT (BMI 25.0-29.9): Status: ACTIVE | Noted: 2021-01-04

## 2022-03-19 PROBLEM — N95.1 MENOPAUSAL FLUSHING: Status: ACTIVE | Noted: 2022-02-07

## 2022-03-19 PROBLEM — K59.1 FUNCTIONAL DIARRHEA: Status: ACTIVE | Noted: 2020-12-17

## 2022-03-19 PROBLEM — M15.9 DEGENERATIVE JOINT DISEASE INVOLVING MULTIPLE JOINTS: Status: ACTIVE | Noted: 2020-08-04

## 2022-03-19 PROBLEM — N63.10 MASS OF RIGHT BREAST: Status: ACTIVE | Noted: 2020-08-04

## 2022-03-19 PROBLEM — R51.9 HEADACHE: Status: ACTIVE | Noted: 2020-08-04

## 2022-03-19 PROBLEM — R10.32 LEFT LOWER QUADRANT ABDOMINAL PAIN: Status: ACTIVE | Noted: 2020-12-17

## 2022-03-19 PROBLEM — R05.8 NOCTURNAL COUGH: Status: ACTIVE | Noted: 2021-03-29

## 2022-03-19 PROBLEM — Z96.652 HISTORY OF ARTHROPLASTY OF LEFT KNEE: Status: ACTIVE | Noted: 2020-10-02

## 2022-03-19 PROBLEM — E78.2 MIXED HYPERLIPIDEMIA: Status: ACTIVE | Noted: 2020-08-04

## 2022-03-19 PROBLEM — K21.9 GERD (GASTROESOPHAGEAL REFLUX DISEASE): Status: ACTIVE | Noted: 2020-08-04

## 2022-03-20 PROBLEM — L50.8 CHRONIC URTICARIA: Status: ACTIVE | Noted: 2020-08-04

## 2022-03-20 PROBLEM — I11.9 MALIGNANT HYPERTENSIVE HEART DISEASE WITHOUT HEART FAILURE: Status: ACTIVE | Noted: 2020-10-02

## 2022-04-28 LAB
ALBUMIN SERPL-MCNC: 4.3 G/DL (ref 3.8–4.9)
ALBUMIN/GLOB SERPL: 1.5 {RATIO} (ref 1.2–2.2)
ALP SERPL-CCNC: 94 IU/L (ref 44–121)
ALT SERPL-CCNC: 24 IU/L (ref 0–32)
AST SERPL-CCNC: 22 IU/L (ref 0–40)
BILIRUB SERPL-MCNC: 0.5 MG/DL (ref 0–1.2)
BUN SERPL-MCNC: 20 MG/DL (ref 6–24)
BUN/CREAT SERPL: 24 (ref 9–23)
CALCIUM SERPL-MCNC: 9.8 MG/DL (ref 8.7–10.2)
CHLORIDE SERPL-SCNC: 104 MMOL/L (ref 96–106)
CHOLEST SERPL-MCNC: 246 MG/DL (ref 100–199)
CO2 SERPL-SCNC: 24 MMOL/L (ref 20–29)
CREAT SERPL-MCNC: 0.83 MG/DL (ref 0.57–1)
EGFR: 83 ML/MIN/1.73
GLOBULIN SER CALC-MCNC: 2.9 G/DL (ref 1.5–4.5)
GLUCOSE SERPL-MCNC: 83 MG/DL (ref 65–99)
HDLC SERPL-MCNC: 66 MG/DL
LDLC SERPL CALC-MCNC: 164 MG/DL (ref 0–99)
POTASSIUM SERPL-SCNC: 4.5 MMOL/L (ref 3.5–5.2)
PROT SERPL-MCNC: 7.2 G/DL (ref 6–8.5)
SODIUM SERPL-SCNC: 142 MMOL/L (ref 134–144)
TRIGL SERPL-MCNC: 92 MG/DL (ref 0–149)
VLDLC SERPL CALC-MCNC: 16 MG/DL (ref 5–40)

## 2022-05-09 ENCOUNTER — OFFICE VISIT (OUTPATIENT)
Dept: FAMILY MEDICINE CLINIC | Age: 57
End: 2022-05-09
Payer: COMMERCIAL

## 2022-05-09 VITALS
HEART RATE: 90 BPM | SYSTOLIC BLOOD PRESSURE: 118 MMHG | RESPIRATION RATE: 18 BRPM | TEMPERATURE: 98.1 F | HEIGHT: 62 IN | DIASTOLIC BLOOD PRESSURE: 78 MMHG | OXYGEN SATURATION: 99 % | WEIGHT: 150.2 LBS | BODY MASS INDEX: 27.64 KG/M2

## 2022-05-09 DIAGNOSIS — K21.00 GASTROESOPHAGEAL REFLUX DISEASE WITH ESOPHAGITIS WITHOUT HEMORRHAGE: ICD-10-CM

## 2022-05-09 DIAGNOSIS — E78.2 MIXED HYPERLIPIDEMIA: ICD-10-CM

## 2022-05-09 DIAGNOSIS — E66.3 OVERWEIGHT (BMI 25.0-29.9): Primary | ICD-10-CM

## 2022-05-09 DIAGNOSIS — I11.9 MALIGNANT HYPERTENSIVE HEART DISEASE WITHOUT HEART FAILURE: ICD-10-CM

## 2022-05-09 PROCEDURE — 99214 OFFICE O/P EST MOD 30 MIN: CPT | Performed by: FAMILY MEDICINE

## 2022-05-09 RX ORDER — PRAVASTATIN SODIUM 40 MG/1
40 TABLET ORAL
Qty: 30 TABLET | Refills: 2 | Status: SHIPPED | OUTPATIENT
Start: 2022-05-09 | End: 2022-06-08

## 2022-05-09 RX ORDER — AMLODIPINE BESYLATE 5 MG/1
5 TABLET ORAL DAILY
Qty: 30 TABLET | Refills: 2 | Status: SHIPPED | OUTPATIENT
Start: 2022-05-09 | End: 2022-05-19

## 2022-05-09 NOTE — PROGRESS NOTES
Shahnaz Rees is a 64 y.o. female and presents with Follow-up, Hypertension, and GERD  . HPI     Subjective:  Cardiovascular Review:  The patient has hypertension   Diet and Lifestyle: generally follows a low fat low cholesterol diet, generally follows a low sodium diet, exercises sporadically  Home BP Monitoring: is not measured at home. Pertinent ROS: taking medications as instructed, no medication side effects noted, no TIA's, no chest pain on exertion, no dyspnea on exertion, no swelling of ankles. Review of Systems  Review of Systems   Constitutional: Negative. Negative for chills and fever. HENT: Negative. Negative for congestion, ear discharge, hearing loss, nosebleeds and tinnitus. Eyes: Negative. Negative for blurred vision, double vision, photophobia and pain. Respiratory: Negative. Negative for cough, hemoptysis and sputum production. Cardiovascular: Negative. Negative for chest pain and palpitations. Gastrointestinal: Negative. Negative for heartburn, nausea and vomiting. Genitourinary: Negative. Negative for dysuria, frequency and urgency. Musculoskeletal: Negative. Negative for back pain and myalgias. Skin: Negative. Neurological: Negative. Negative for dizziness, tingling, weakness and headaches. Endo/Heme/Allergies: Negative. Psychiatric/Behavioral: Negative. Negative for depression and suicidal ideas. The patient does not have insomnia. All other systems reviewed and are negative.         Past Medical History:   Diagnosis Date    Allergies     Back pain     GERD (gastroesophageal reflux disease)     Headache     History of throat problem     Hypercholesterolemia     Hypertension     Sinus problem      Past Surgical History:   Procedure Laterality Date    HX GYN      HX ORTHOPAEDIC Left 2001    knee    HX PARTIAL HYSTERECTOMY      HX TUBAL LIGATION  1993     Social History     Socioeconomic History    Marital status:    Tobacco Use    Smoking status: Never Smoker    Smokeless tobacco: Never Used   Vaping Use    Vaping Use: Never used   Substance and Sexual Activity    Alcohol use: Yes     Comment: wine occ     Drug use: Never     Family History   Problem Relation Age of Onset    Hypertension Father     Ovarian Cancer Maternal Aunt      Current Outpatient Medications   Medication Sig Dispense Refill    pravastatin (PRAVACHOL) 40 mg tablet Take 1 Tablet by mouth nightly for 30 days. 30 Tablet 2    amLODIPine (NORVASC) 5 mg tablet Take 1 Tablet by mouth daily. 30 Tablet 2    clotrimazole-betamethasone (LOTRISONE) topical cream Apply bid x 14 days (Patient not taking: Reported on 5/9/2022) 45 g 0    naproxen (NAPROSYN) 500 mg tablet Take 1 Tab by mouth two (2) times daily (with meals). (Patient not taking: Reported on 2/7/2022) 30 Tab 1    acetaminophen (TYLENOL) 500 mg tablet Take  by mouth every six (6) hours as needed for Pain.  (Patient not taking: Reported on 2/7/2022)       Allergies   Allergen Reactions    Cat Dander Other (comments)     Not allergic     Dog Dander Other (comments)     Not allergic     Pcn [Penicillins] Other (comments)    Percocet [Oxycodone-Acetaminophen] Itching       Objective:  Visit Vitals  /78 (BP 1 Location: Left upper arm, BP Patient Position: Sitting, BP Cuff Size: Adult)   Pulse 90   Temp 98.1 °F (36.7 °C) (Oral)   Resp 18   Ht 5' 2\" (1.575 m)   Wt 150 lb 3.2 oz (68.1 kg)   LMP  (LMP Unknown) Comment: partial hysterectomy   SpO2 99%   BMI 27.47 kg/m²       Physical Exam:   Physical Exam        Results for orders placed or performed in visit on 02/07/22   LIPID PANEL   Result Value Ref Range    Cholesterol, total 246 (H) 100 - 199 mg/dL    Triglyceride 92 0 - 149 mg/dL    HDL Cholesterol 66 >39 mg/dL    VLDL, calculated 16 5 - 40 mg/dL    LDL, calculated 164 (H) 0 - 99 mg/dL   METABOLIC PANEL, COMPREHENSIVE   Result Value Ref Range    Glucose 83 65 - 99 mg/dL    BUN 20 6 - 24 mg/dL    Creatinine 0. 83 0.57 - 1.00 mg/dL    eGFR 83 >59 mL/min/1.73    BUN/Creatinine ratio 24 (H) 9 - 23    Sodium 142 134 - 144 mmol/L    Potassium 4.5 3.5 - 5.2 mmol/L    Chloride 104 96 - 106 mmol/L    CO2 24 20 - 29 mmol/L    Calcium 9.8 8.7 - 10.2 mg/dL    Protein, total 7.2 6.0 - 8.5 g/dL    Albumin 4.3 3.8 - 4.9 g/dL    GLOBULIN, TOTAL 2.9 1.5 - 4.5 g/dL    A-G Ratio 1.5 1.2 - 2.2    Bilirubin, total 0.5 0.0 - 1.2 mg/dL    Alk. phosphatase 94 44 - 121 IU/L    AST (SGOT) 22 0 - 40 IU/L    ALT (SGPT) 24 0 - 32 IU/L       Assessment/Plan:    ICD-10-CM ICD-9-CM    1. Overweight (BMI 25.0-29. 9)  E66.3 278.02    2. Gastroesophageal reflux disease with esophagitis without hemorrhage  K21.00 530.81      530.10    3. Malignant hypertensive heart disease without heart failure  I11.9 402.00    4. Mixed hyperlipidemia  E78.2 272.2      Orders Placed This Encounter    pravastatin (PRAVACHOL) 40 mg tablet     Sig: Take 1 Tablet by mouth nightly for 30 days. Dispense:  30 Tablet     Refill:  2     Cannot display discharge medications since this is not an admission.

## 2022-05-19 RX ORDER — AMLODIPINE BESYLATE 5 MG/1
5 TABLET ORAL DAILY
Qty: 30 TABLET | Refills: 2 | Status: SHIPPED | OUTPATIENT
Start: 2022-05-19 | End: 2022-08-18

## 2022-08-10 ENCOUNTER — OFFICE VISIT (OUTPATIENT)
Dept: FAMILY MEDICINE CLINIC | Age: 57
End: 2022-08-10
Payer: COMMERCIAL

## 2022-08-10 VITALS
DIASTOLIC BLOOD PRESSURE: 89 MMHG | WEIGHT: 151 LBS | BODY MASS INDEX: 27.79 KG/M2 | HEIGHT: 62 IN | OXYGEN SATURATION: 97 % | SYSTOLIC BLOOD PRESSURE: 139 MMHG | RESPIRATION RATE: 15 BRPM | TEMPERATURE: 97.9 F | HEART RATE: 87 BPM

## 2022-08-10 DIAGNOSIS — E66.3 OVERWEIGHT (BMI 25.0-29.9): ICD-10-CM

## 2022-08-10 DIAGNOSIS — E78.3 MIXED HYPERGLYCERIDEMIA: ICD-10-CM

## 2022-08-10 DIAGNOSIS — M15.9 PRIMARY OSTEOARTHRITIS INVOLVING MULTIPLE JOINTS: ICD-10-CM

## 2022-08-10 DIAGNOSIS — K21.00 GASTROESOPHAGEAL REFLUX DISEASE WITH ESOPHAGITIS WITHOUT HEMORRHAGE: ICD-10-CM

## 2022-08-10 DIAGNOSIS — I11.9 MALIGNANT HYPERTENSIVE HEART DISEASE WITHOUT HEART FAILURE: Primary | ICD-10-CM

## 2022-08-10 PROCEDURE — 99214 OFFICE O/P EST MOD 30 MIN: CPT | Performed by: FAMILY MEDICINE

## 2022-08-10 RX ORDER — PRAVASTATIN SODIUM 40 MG/1
40 TABLET ORAL
COMMUNITY
Start: 2022-07-13 | End: 2022-08-10 | Stop reason: SDUPTHER

## 2022-08-10 RX ORDER — PRAVASTATIN SODIUM 40 MG/1
40 TABLET ORAL
Qty: 90 TABLET | Refills: 0 | Status: SHIPPED | OUTPATIENT
Start: 2022-08-10 | End: 2022-10-11

## 2022-08-10 NOTE — PROGRESS NOTES
1. \"Have you been to the ER, urgent care clinic since your last visit? Hospitalized since your last visit? \" No    2. \"Have you seen or consulted any other health care providers outside of the [address] [place] [address] since your last visit? \" No     3. For patients aged 39-70: Has the patient had a colonoscopy / FIT/ Cologuard? Yes - no Care Gap present      If the patient is female:    4. For patients aged 41-77: Has the patient had a mammogram within the past 2 years? Yes - Care Gap present. Most recent result on file      5. For patients aged 21-65: Has the patient had a pap smear? No     Chief Complaint   Patient presents with    Follow-up    Hypertension     Visit Vitals  /89 (BP 1 Location: Right upper arm, BP Patient Position: Sitting, BP Cuff Size: Adult)   Pulse 87   Temp 97.9 °F (36.6 °C) (Oral)   Resp 15   Ht 5' 2\" (1.575 m)   Wt 151 lb (68.5 kg)   LMP  (LMP Unknown) Comment: partial hysterectomy   SpO2 97%   BMI 27.62 kg/m²     Pt is here for a follow up.

## 2022-08-10 NOTE — PROGRESS NOTES
Salbador Miles is a 64 y.o. female and presents with Follow-up and Hypertension  . HPI     Subjective:  Cardiovascular Review:  The patient has hypertension   Diet and Lifestyle: generally follows a low fat low cholesterol diet, generally follows a low sodium diet, exercises sporadically  Home BP Monitoring: is not measured at home. Pertinent ROS: taking medications as instructed, no medication side effects noted, no TIA's, no chest pain on exertion, no dyspnea on exertion, no swelling of ankles. Review of Systems  Review of Systems   Constitutional: Negative. Negative for chills and fever. HENT: Negative. Negative for congestion, ear discharge, hearing loss, nosebleeds and tinnitus. Eyes: Negative. Negative for blurred vision, double vision, photophobia and pain. Respiratory: Negative. Negative for cough, hemoptysis and sputum production. Cardiovascular: Negative. Negative for chest pain and palpitations. Gastrointestinal: Negative. Negative for heartburn, nausea and vomiting. Genitourinary: Negative. Negative for dysuria, frequency and urgency. Musculoskeletal: Negative. Negative for back pain and myalgias. Skin: Negative. Neurological: Negative. Negative for dizziness, tingling, weakness and headaches. Endo/Heme/Allergies: Negative. Psychiatric/Behavioral: Negative. Negative for depression and suicidal ideas. The patient does not have insomnia. All other systems reviewed and are negative.       Past Medical History:   Diagnosis Date    Allergies     Back pain     GERD (gastroesophageal reflux disease)     Headache     History of throat problem     Hypercholesterolemia     Hypertension     Sinus problem      Past Surgical History:   Procedure Laterality Date    HX GYN      HX ORTHOPAEDIC Left 2001    knee    HX PARTIAL HYSTERECTOMY      HX TUBAL LIGATION  1993     Social History     Socioeconomic History    Marital status:    Tobacco Use    Smoking status: Never Smokeless tobacco: Never   Vaping Use    Vaping Use: Never used   Substance and Sexual Activity    Alcohol use: Yes     Comment: wine occ     Drug use: Never     Social Determinants of Health     Financial Resource Strain: Low Risk     Difficulty of Paying Living Expenses: Not hard at all   Food Insecurity: No Food Insecurity    Worried About Running Out of Food in the Last Year: Never true    Ran Out of Food in the Last Year: Never true     Family History   Problem Relation Age of Onset    Hypertension Father     Ovarian Cancer Maternal Aunt      Current Outpatient Medications   Medication Sig Dispense Refill    pravastatin (PRAVACHOL) 40 mg tablet Take 1 Tablet by mouth nightly for 90 days. Indications: high cholesterol and high triglycerides 90 Tablet 0    amLODIPine (NORVASC) 5 mg tablet TAKE 1 TABLET BY MOUTH DAILY 30 Tablet 2    clotrimazole-betamethasone (LOTRISONE) topical cream Apply bid x 14 days (Patient not taking: No sig reported) 45 g 0    naproxen (NAPROSYN) 500 mg tablet Take 1 Tab by mouth two (2) times daily (with meals). (Patient not taking: No sig reported) 30 Tab 1    acetaminophen (TYLENOL) 500 mg tablet Take  by mouth every six (6) hours as needed for Pain. (Patient not taking: No sig reported)       Allergies   Allergen Reactions    Cat Dander Other (comments)     Not allergic     Dog Dander Other (comments)     Not allergic     Pcn [Penicillins] Other (comments)    Percocet [Oxycodone-Acetaminophen] Itching       Objective:  Visit Vitals  /89 (BP 1 Location: Right upper arm, BP Patient Position: Sitting, BP Cuff Size: Adult)   Pulse 87   Temp 97.9 °F (36.6 °C) (Oral)   Resp 15   Ht 5' 2\" (1.575 m)   Wt 151 lb (68.5 kg)   LMP  (LMP Unknown) Comment: partial hysterectomy   SpO2 97%   BMI 27.62 kg/m²       Physical Exam:   Physical Exam  Vitals and nursing note reviewed. Constitutional:       Appearance: Normal appearance. She is obese.    HENT:      Head: Normocephalic and atraumatic. Right Ear: Tympanic membrane, ear canal and external ear normal.      Left Ear: Tympanic membrane, ear canal and external ear normal.      Nose: Nose normal.      Mouth/Throat:      Mouth: Mucous membranes are moist.      Pharynx: Oropharynx is clear. No oropharyngeal exudate or posterior oropharyngeal erythema. Eyes:      General: No scleral icterus. Right eye: No discharge. Left eye: No discharge. Extraocular Movements: Extraocular movements intact. Conjunctiva/sclera: Conjunctivae normal.      Pupils: Pupils are equal, round, and reactive to light. Neck:      Vascular: No carotid bruit. Cardiovascular:      Rate and Rhythm: Normal rate. Pulses: Normal pulses. Heart sounds: Normal heart sounds. No murmur heard. No gallop. Pulmonary:      Effort: Pulmonary effort is normal. No respiratory distress. Breath sounds: Normal breath sounds. No stridor. No wheezing, rhonchi or rales. Chest:      Chest wall: No tenderness. Abdominal:      General: Bowel sounds are normal. There is no distension. Palpations: Abdomen is soft. There is no mass. Tenderness: There is no abdominal tenderness. There is no right CVA tenderness, left CVA tenderness or rebound. Hernia: No hernia is present. Musculoskeletal:         General: No swelling, tenderness, deformity or signs of injury. Normal range of motion. Cervical back: Normal range of motion and neck supple. No rigidity. No muscular tenderness. Right lower leg: No edema. Left lower leg: No edema. Lymphadenopathy:      Cervical: No cervical adenopathy. Skin:     General: Skin is warm. Capillary Refill: Capillary refill takes 2 to 3 seconds. Coloration: Skin is not jaundiced or pale. Findings: No bruising, erythema, lesion or rash. Neurological:      General: No focal deficit present. Mental Status: She is alert and oriented to person, place, and time. Cranial Nerves: No cranial nerve deficit. Sensory: No sensory deficit. Motor: No weakness. Coordination: Coordination normal.      Gait: Gait normal.      Deep Tendon Reflexes: Reflexes normal.   Psychiatric:         Mood and Affect: Mood normal.         Behavior: Behavior normal.         Thought Content: Thought content normal.         Judgment: Judgment normal.           Results for orders placed or performed in visit on 02/07/22   LIPID PANEL   Result Value Ref Range    Cholesterol, total 246 (H) 100 - 199 mg/dL    Triglyceride 92 0 - 149 mg/dL    HDL Cholesterol 66 >39 mg/dL    VLDL, calculated 16 5 - 40 mg/dL    LDL, calculated 164 (H) 0 - 99 mg/dL   METABOLIC PANEL, COMPREHENSIVE   Result Value Ref Range    Glucose 83 65 - 99 mg/dL    BUN 20 6 - 24 mg/dL    Creatinine 0.83 0.57 - 1.00 mg/dL    eGFR 83 >59 mL/min/1.73    BUN/Creatinine ratio 24 (H) 9 - 23    Sodium 142 134 - 144 mmol/L    Potassium 4.5 3.5 - 5.2 mmol/L    Chloride 104 96 - 106 mmol/L    CO2 24 20 - 29 mmol/L    Calcium 9.8 8.7 - 10.2 mg/dL    Protein, total 7.2 6.0 - 8.5 g/dL    Albumin 4.3 3.8 - 4.9 g/dL    GLOBULIN, TOTAL 2.9 1.5 - 4.5 g/dL    A-G Ratio 1.5 1.2 - 2.2    Bilirubin, total 0.5 0.0 - 1.2 mg/dL    Alk. phosphatase 94 44 - 121 IU/L    AST (SGOT) 22 0 - 40 IU/L    ALT (SGPT) 24 0 - 32 IU/L       Assessment/Plan:    ICD-10-CM ICD-9-CM    1. Malignant hypertensive heart disease without heart failure  I11.9 402.00       2. Gastroesophageal reflux disease with esophagitis without hemorrhage  K21.00 530.81      530.10       3. Primary osteoarthritis involving multiple joints  M15.9 715.98       4. Mixed hyperglyceridemia  E78.3 272.3 LIPID PANEL      5. Overweight (BMI 25.0-29. 9)  E66.3 278.02         Orders Placed This Encounter    LIPID PANEL    DISCONTD: pravastatin (PRAVACHOL) 40 mg tablet     Sig: Take 40 mg by mouth nightly. pravastatin (PRAVACHOL) 40 mg tablet     Sig: Take 1 Tablet by mouth nightly for 90 days. Indications: high cholesterol and high triglycerides     Dispense:  90 Tablet     Refill:  0     Cannot display discharge medications since this is not an admission.

## 2022-08-18 RX ORDER — AMLODIPINE BESYLATE 5 MG/1
5 TABLET ORAL DAILY
Qty: 30 TABLET | Refills: 2 | Status: SHIPPED | OUTPATIENT
Start: 2022-08-18

## 2022-09-09 ENCOUNTER — TRANSCRIBE ORDER (OUTPATIENT)
Dept: SCHEDULING | Age: 57
End: 2022-09-09

## 2022-09-09 DIAGNOSIS — Z12.31 VISIT FOR SCREENING MAMMOGRAM: Primary | ICD-10-CM

## 2022-09-14 ENCOUNTER — HOSPITAL ENCOUNTER (OUTPATIENT)
Dept: MAMMOGRAPHY | Age: 57
Discharge: HOME | End: 2022-09-14
Attending: FAMILY MEDICINE
Payer: COMMERCIAL

## 2022-09-14 DIAGNOSIS — Z12.31 VISIT FOR SCREENING MAMMOGRAM: ICD-10-CM

## 2022-09-14 PROCEDURE — 77063 BREAST TOMOSYNTHESIS BI: CPT

## 2022-10-01 ENCOUNTER — HOSPITAL ENCOUNTER (EMERGENCY)
Age: 57
Discharge: HOME | End: 2022-10-01
Attending: EMERGENCY MEDICINE
Payer: COMMERCIAL

## 2022-10-01 VITALS
WEIGHT: 149 LBS | HEART RATE: 76 BPM | BODY MASS INDEX: 27.42 KG/M2 | OXYGEN SATURATION: 98 % | RESPIRATION RATE: 18 BRPM | HEIGHT: 62 IN | SYSTOLIC BLOOD PRESSURE: 143 MMHG | TEMPERATURE: 97.8 F | DIASTOLIC BLOOD PRESSURE: 73 MMHG

## 2022-10-01 DIAGNOSIS — J02.9 SORETHROAT: Primary | ICD-10-CM

## 2022-10-01 PROCEDURE — 74011250637 HC RX REV CODE- 250/637: Performed by: EMERGENCY MEDICINE

## 2022-10-01 PROCEDURE — 74011636637 HC RX REV CODE- 636/637: Performed by: EMERGENCY MEDICINE

## 2022-10-01 PROCEDURE — 99283 EMERGENCY DEPT VISIT LOW MDM: CPT

## 2022-10-01 RX ORDER — PREDNISONE 20 MG/1
TABLET ORAL
Qty: 12 TABLET | Refills: 0 | Status: SHIPPED | OUTPATIENT
Start: 2022-10-01

## 2022-10-01 RX ORDER — IBUPROFEN 800 MG/1
800 TABLET ORAL
Qty: 20 TABLET | Refills: 0 | Status: SHIPPED | OUTPATIENT
Start: 2022-10-01 | End: 2022-10-08

## 2022-10-01 RX ORDER — AZITHROMYCIN 250 MG/1
TABLET, FILM COATED ORAL
Qty: 6 TABLET | Refills: 0 | Status: SHIPPED | OUTPATIENT
Start: 2022-10-01

## 2022-10-01 RX ORDER — IBUPROFEN 800 MG/1
800 TABLET ORAL ONCE
Status: COMPLETED | OUTPATIENT
Start: 2022-10-01 | End: 2022-10-01

## 2022-10-01 RX ORDER — ACETAMINOPHEN 500 MG
1000 TABLET ORAL ONCE
Status: COMPLETED | OUTPATIENT
Start: 2022-10-01 | End: 2022-10-01

## 2022-10-01 RX ORDER — PREDNISONE 20 MG/1
60 TABLET ORAL ONCE
Status: COMPLETED | OUTPATIENT
Start: 2022-10-01 | End: 2022-10-01

## 2022-10-01 RX ADMIN — IBUPROFEN 800 MG: 800 TABLET, FILM COATED ORAL at 02:42

## 2022-10-01 RX ADMIN — ACETAMINOPHEN 1000 MG: 500 TABLET ORAL at 02:42

## 2022-10-01 RX ADMIN — PREDNISONE 60 MG: 20 TABLET ORAL at 02:42

## 2022-10-01 NOTE — ED PROVIDER NOTES
EMERGENCY DEPARTMENT HISTORY AND PHYSICAL EXAM      Date: 10/1/2022  Patient Name: Jennifer Neal    History of Presenting Illness     Chief Complaint   Patient presents with    Sore Throat    Nasal Congestion       History Provided By: Patient    HPI: Jennifer Neal, 64 y.o. female   presents to the ED with cc of sore throat and nasal congestion. Patient complains of sudden onset of mild nasal congestion with sore throat last evening. Pain has been constant and moderate degree with any swallowing aggravating the pain. No fever chills. No cough. No generalized body ache. No nausea vomiting diarrhea. No obvious exposed to COVID-19. No OTC treatment. PCP: Uzair Garcia MD    No current facility-administered medications on file prior to encounter. Current Outpatient Medications on File Prior to Encounter   Medication Sig Dispense Refill    amLODIPine (NORVASC) 5 mg tablet TAKE 1 TABLET BY MOUTH DAILY 30 Tablet 2    pravastatin (PRAVACHOL) 40 mg tablet Take 1 Tablet by mouth nightly for 90 days. Indications: high cholesterol and high triglycerides 90 Tablet 0    clotrimazole-betamethasone (LOTRISONE) topical cream Apply bid x 14 days (Patient not taking: No sig reported) 45 g 0    naproxen (NAPROSYN) 500 mg tablet Take 1 Tab by mouth two (2) times daily (with meals). (Patient not taking: No sig reported) 30 Tab 1    acetaminophen (TYLENOL) 500 mg tablet Take  by mouth every six (6) hours as needed for Pain.  (Patient not taking: No sig reported)         Past History     Past Medical History:  Past Medical History:   Diagnosis Date    Allergies     Back pain     GERD (gastroesophageal reflux disease)     Headache     History of throat problem     Hypercholesterolemia     Hypertension     Sinus problem        Past Surgical History:  Past Surgical History:   Procedure Laterality Date    HX GYN      HX ORTHOPAEDIC Left 2001    knee    HX PARTIAL HYSTERECTOMY      HX TUBAL LIGATION  1993       Family History:  Family History   Problem Relation Age of Onset    Hypertension Father     Ovarian Cancer Maternal Aunt        Social History:  Social History     Tobacco Use    Smoking status: Never    Smokeless tobacco: Never   Vaping Use    Vaping Use: Never used   Substance Use Topics    Alcohol use: Yes     Comment: wine occ     Drug use: Never       Allergies: Allergies   Allergen Reactions    Cat Dander Other (comments)     Not allergic     Dog Dander Other (comments)     Not allergic     Pcn [Penicillins] Other (comments)    Percocet [Oxycodone-Acetaminophen] Itching         Review of Systems   Review of Systems   Constitutional:  Negative for chills and fever. HENT:  Positive for rhinorrhea and sore throat. Eyes:  Negative for discharge. Respiratory:  Negative for shortness of breath. Cardiovascular:  Negative for chest pain. Gastrointestinal:  Negative for abdominal pain and vomiting. Genitourinary:  Negative for dysuria. Musculoskeletal:  Negative for joint swelling. Skin:  Negative for rash. Neurological:  Negative for headaches. Psychiatric/Behavioral:  Negative for suicidal ideas. Physical Exam   Physical Exam  Vitals and nursing note reviewed. Constitutional:       General: She is not in acute distress. Appearance: Normal appearance. She is well-developed. She is not ill-appearing or toxic-appearing. HENT:      Head: Normocephalic and atraumatic. Nose: No congestion. Mouth/Throat:      Mouth: Mucous membranes are moist.      Pharynx: Posterior oropharyngeal erythema present. No oropharyngeal exudate. Comments: No tonsillar enlargement or exudate. Mild erythema of the pharynx. No muffled voice. No stridor. Eyes:      Conjunctiva/sclera: Conjunctivae normal.   Cardiovascular:      Rate and Rhythm: Regular rhythm. Heart sounds: Normal heart sounds. Pulmonary:      Effort: Pulmonary effort is normal.      Breath sounds: Normal breath sounds. Abdominal:      General: Bowel sounds are normal.      Palpations: Abdomen is soft. Tenderness: There is no abdominal tenderness. Musculoskeletal:         General: No deformity. Cervical back: Neck supple. No rigidity. Lymphadenopathy:      Cervical: No cervical adenopathy. Skin:     General: Skin is warm and dry. Neurological:      General: No focal deficit present. Mental Status: She is alert. Psychiatric:         Mood and Affect: Mood normal.       Diagnostic Study Results     Labs -   No results found for this or any previous visit (from the past 12 hour(s)). Radiologic Studies -   No orders to display     CT Results  (Last 48 hours)      None          CXR Results  (Last 48 hours)      None              Medical Decision Making   I am the first provider for this patient. I reviewed the vital signs, available nursing notes, past medical history, past surgical history, family history and social history. Vital Signs-Reviewed the patient's vital signs. Patient Vitals for the past 12 hrs:   Temp Pulse Resp BP SpO2   10/01/22 0223 97.8 °F (36.6 °C) 76 18 (!) 143/73 98 %       Records Reviewed:     Provider Notes (Medical Decision Making):       ED Course:   Initial assessment performed. The patients presenting problems have been discussed, and they are in agreement with the care plan formulated and outlined with them. I have encouraged them to ask questions as they arise throughout their visit. PROCEDURES      Disposition: Condition stable   DC- Adult Discharges: All of the diagnostic tests were reviewed and questions answered. Diagnosis, care plan and treatment options were discussed. understand instructions and will follow up as directed. The patients results have been reviewed with them. They have been counseled regarding their diagnosis.   The patient verbally convey understanding and agreement of the signs, symptoms, diagnosis, treatment and prognosis and additionally agrees to follow up as recommended. They also agree with the care-plan and convey that all of their questions have been answered. I have also put together some discharge instructions for them that include: 1) educational information regarding their diagnosis, 2) how to care for their diagnosis at home, as well a 3) list of reasons why they would want to return to the ED prior to their follow-up appointment, should their condition change. PLAN:  1. Current Discharge Medication List        START taking these medications    Details   ibuprofen (MOTRIN) 800 mg tablet Take 1 Tablet by mouth every eight (8) hours as needed for Pain for up to 7 days. Qty: 20 Tablet, Refills: 0  Start date: 10/1/2022, End date: 10/8/2022      predniSONE (DELTASONE) 20 mg tablet 3 tablets for 2 days then 2 tablets for 2 days then 1 tablet for 2 day  Qty: 12 Tablet, Refills: 0  Start date: 10/1/2022      azithromycin (Zithromax Z-Caden) 250 mg tablet As instructed in the pack  Qty: 6 Tablet, Refills: 0  Start date: 10/1/2022           2. Follow-up Information       Follow up With Specialties Details Why Contact Info    Follow up with your primary care physician  Schedule an appointment as soon as possible for a visit in 3 days As needed           Return to ED if worse     Diagnosis     Clinical Impression:   1. Sorethroat        Please note that this dictation was completed with [employer], the computer voice recognition software. Quite often unanticipated grammatical, syntax, homophones, and other interpretive errors are inadvertently transcribed by the computer software. Please disregard these errors. Please excuse any errors that have escaped final proofreading. Thank you.

## 2022-10-01 NOTE — Clinical Note
[address] EMERGENCY DEPARTMENT  [address] [facility] [address]  [phone]    Work/School Note    Date: 10/1/2022    To Whom It May concern:    Jair Mclean was seen and treated today in the emergency room by the following provider(s):  Attending Provider: Kervin Morin MD.      Jair Mclean is excused from work/school on 10/01/22 and 10/02/22. She is medically clear to return to work/school on 10/3/2022.        Sincerely,          Frantz Colbetr MD

## 2022-10-01 NOTE — ED TRIAGE NOTES
Patient presents with sore throat and nasal congestion/drainage that began last night. Patient states hurts to swallow.  No medications PTA

## 2022-10-11 RX ORDER — PRAVASTATIN SODIUM 40 MG/1
TABLET ORAL
Qty: 90 TABLET | Refills: 0 | Status: SHIPPED | OUTPATIENT
Start: 2022-10-11

## 2022-12-29 ENCOUNTER — OFFICE VISIT (OUTPATIENT)
Dept: FAMILY MEDICINE CLINIC | Age: 57
End: 2022-12-29
Payer: COMMERCIAL

## 2022-12-29 VITALS
RESPIRATION RATE: 16 BRPM | BODY MASS INDEX: 29 KG/M2 | HEIGHT: 62 IN | DIASTOLIC BLOOD PRESSURE: 78 MMHG | OXYGEN SATURATION: 99 % | WEIGHT: 157.6 LBS | SYSTOLIC BLOOD PRESSURE: 134 MMHG | HEART RATE: 78 BPM | TEMPERATURE: 98.4 F

## 2022-12-29 DIAGNOSIS — M54.6 ACUTE MIDLINE THORACIC BACK PAIN: ICD-10-CM

## 2022-12-29 DIAGNOSIS — I11.9 MALIGNANT HYPERTENSIVE HEART DISEASE WITHOUT HEART FAILURE: Primary | ICD-10-CM

## 2022-12-29 DIAGNOSIS — N95.1 MENOPAUSAL FLUSHING: ICD-10-CM

## 2022-12-29 DIAGNOSIS — M15.9 PRIMARY OSTEOARTHRITIS INVOLVING MULTIPLE JOINTS: ICD-10-CM

## 2022-12-29 DIAGNOSIS — E78.2 MIXED HYPERLIPIDEMIA: ICD-10-CM

## 2022-12-29 DIAGNOSIS — E66.3 OVERWEIGHT (BMI 25.0-29.9): ICD-10-CM

## 2022-12-29 PROCEDURE — 3074F SYST BP LT 130 MM HG: CPT | Performed by: FAMILY MEDICINE

## 2022-12-29 PROCEDURE — 99214 OFFICE O/P EST MOD 30 MIN: CPT | Performed by: FAMILY MEDICINE

## 2022-12-29 PROCEDURE — 3078F DIAST BP <80 MM HG: CPT | Performed by: FAMILY MEDICINE

## 2022-12-29 RX ORDER — AMLODIPINE BESYLATE 5 MG/1
5 TABLET ORAL DAILY
Qty: 30 TABLET | Refills: 2 | Status: SHIPPED | OUTPATIENT
Start: 2022-12-29

## 2022-12-29 RX ORDER — PRAVASTATIN SODIUM 40 MG/1
TABLET ORAL
Qty: 90 TABLET | Refills: 0 | Status: SHIPPED | OUTPATIENT
Start: 2022-12-29

## 2022-12-29 NOTE — PROGRESS NOTES
Nichelle Paredes is a 62 y.o. female and presents with Follow Up Chronic Condition and Back Pain  . HPI     Subjective:  Cardiovascular Review:  The patient has hypertension   Diet and Lifestyle: generally follows a low fat low cholesterol diet, generally follows a low sodium diet, exercises sporadically  Home BP Monitoring: is not measured at home. Pertinent ROS: taking medications as instructed, no medication side effects noted, no TIA's, no chest pain on exertion, no dyspnea on exertion, no swelling of ankles. Review of Systems  Review of Systems   Constitutional: Negative. Negative for chills and fever. HENT: Negative. Negative for congestion, ear discharge, hearing loss, nosebleeds and tinnitus. Eyes: Negative. Negative for blurred vision, double vision, photophobia and pain. Respiratory: Negative. Negative for cough, hemoptysis and sputum production. Cardiovascular: Negative. Negative for chest pain and palpitations. Gastrointestinal: Negative. Negative for heartburn, nausea and vomiting. Genitourinary: Negative. Negative for dysuria, frequency and urgency. Musculoskeletal:  Positive for back pain. Negative for myalgias. Skin: Negative. Neurological: Negative. Negative for dizziness, tingling, weakness and headaches. Endo/Heme/Allergies: Negative. Psychiatric/Behavioral: Negative. Negative for depression and suicidal ideas. The patient does not have insomnia. All other systems reviewed and are negative.       Past Medical History:   Diagnosis Date    Allergies     Back pain     GERD (gastroesophageal reflux disease)     Headache     History of throat problem     Hypercholesterolemia     Hypertension     Sinus problem      Past Surgical History:   Procedure Laterality Date    HX GYN      HX ORTHOPAEDIC Left 2001    knee    HX PARTIAL HYSTERECTOMY      HX TUBAL LIGATION  1993     Social History     Socioeconomic History    Marital status:    Tobacco Use    Smoking status: Never    Smokeless tobacco: Never   Vaping Use    Vaping Use: Never used   Substance and Sexual Activity    Alcohol use: Yes     Comment: wine occ     Drug use: Never     Social Determinants of Health     Financial Resource Strain: Low Risk     Difficulty of Paying Living Expenses: Not hard at all   Food Insecurity: No Food Insecurity    Worried About Running Out of Food in the Last Year: Never true    Ran Out of Food in the Last Year: Never true     Family History   Problem Relation Age of Onset    Hypertension Father     Ovarian Cancer Maternal Aunt      Current Outpatient Medications   Medication Sig Dispense Refill    pravastatin (PRAVACHOL) 40 mg tablet TAKE 1 TABLET BY MOUTH EVERY NIGHT FOR HIGH CHOLESTEROL 90 Tablet 0    predniSONE (DELTASONE) 20 mg tablet 3 tablets for 2 days then 2 tablets for 2 days then 1 tablet for 2 day 12 Tablet 0    azithromycin (Zithromax Z-Caden) 250 mg tablet As instructed in the pack 6 Tablet 0    amLODIPine (NORVASC) 5 mg tablet TAKE 1 TABLET BY MOUTH DAILY 30 Tablet 2    naproxen (NAPROSYN) 500 mg tablet Take 1 Tab by mouth two (2) times daily (with meals). 30 Tab 1    clotrimazole-betamethasone (LOTRISONE) topical cream Apply bid x 14 days (Patient not taking: No sig reported) 45 g 0    acetaminophen (TYLENOL) 500 mg tablet Take  by mouth every six (6) hours as needed for Pain.  (Patient not taking: No sig reported)       Allergies   Allergen Reactions    Cat Dander Other (comments)     Not allergic     Dog Dander Other (comments)     Not allergic     Pcn [Penicillins] Other (comments)    Percocet [Oxycodone-Acetaminophen] Itching       Objective:  Visit Vitals  /78 (BP 1 Location: Left upper arm, BP Patient Position: Sitting, BP Cuff Size: Large adult)   Pulse 78   Temp 98.4 °F (36.9 °C) (Oral)   Resp 16   Ht 5' 2\" (1.575 m)   Wt 157 lb 9.6 oz (71.5 kg)   LMP  (LMP Unknown) Comment: partial hysterectomy   SpO2 99%   BMI 28.83 kg/m²       Physical Exam:   Physical Exam  Vitals and nursing note reviewed. Constitutional:       Appearance: Normal appearance. She is obese. HENT:      Head: Normocephalic and atraumatic. Right Ear: Tympanic membrane, ear canal and external ear normal.      Left Ear: Tympanic membrane, ear canal and external ear normal.      Nose: Nose normal.      Mouth/Throat:      Mouth: Mucous membranes are moist.      Pharynx: Oropharynx is clear. No oropharyngeal exudate or posterior oropharyngeal erythema. Eyes:      General: No scleral icterus. Right eye: No discharge. Left eye: No discharge. Extraocular Movements: Extraocular movements intact. Conjunctiva/sclera: Conjunctivae normal.      Pupils: Pupils are equal, round, and reactive to light. Neck:      Vascular: No carotid bruit. Cardiovascular:      Rate and Rhythm: Normal rate. Pulses: Normal pulses. Heart sounds: Normal heart sounds. No murmur heard. No gallop. Pulmonary:      Effort: Pulmonary effort is normal. No respiratory distress. Breath sounds: Normal breath sounds. No stridor. No wheezing, rhonchi or rales. Chest:      Chest wall: No tenderness. Abdominal:      General: Bowel sounds are normal. There is no distension. Palpations: Abdomen is soft. There is no mass. Tenderness: There is no abdominal tenderness. There is no right CVA tenderness, left CVA tenderness or rebound. Hernia: No hernia is present. Musculoskeletal:         General: Tenderness (lumbar L4-L5) present. No swelling, deformity or signs of injury. Normal range of motion. Cervical back: Normal range of motion and neck supple. No rigidity. No muscular tenderness. Right lower leg: No edema. Left lower leg: No edema. Lymphadenopathy:      Cervical: No cervical adenopathy. Skin:     General: Skin is warm. Capillary Refill: Capillary refill takes 2 to 3 seconds. Coloration: Skin is not jaundiced or pale.       Findings: No bruising, erythema, lesion or rash. Neurological:      General: No focal deficit present. Mental Status: She is alert and oriented to person, place, and time. Cranial Nerves: No cranial nerve deficit. Sensory: No sensory deficit. Motor: No weakness. Coordination: Coordination normal.      Gait: Gait normal.      Deep Tendon Reflexes: Reflexes normal.   Psychiatric:         Mood and Affect: Mood normal.         Behavior: Behavior normal.         Thought Content: Thought content normal.         Judgment: Judgment normal.           Results for orders placed or performed in visit on 02/07/22   LIPID PANEL   Result Value Ref Range    Cholesterol, total 246 (H) 100 - 199 mg/dL    Triglyceride 92 0 - 149 mg/dL    HDL Cholesterol 66 >39 mg/dL    VLDL, calculated 16 5 - 40 mg/dL    LDL, calculated 164 (H) 0 - 99 mg/dL   METABOLIC PANEL, COMPREHENSIVE   Result Value Ref Range    Glucose 83 65 - 99 mg/dL    BUN 20 6 - 24 mg/dL    Creatinine 0.83 0.57 - 1.00 mg/dL    eGFR 83 >59 mL/min/1.73    BUN/Creatinine ratio 24 (H) 9 - 23    Sodium 142 134 - 144 mmol/L    Potassium 4.5 3.5 - 5.2 mmol/L    Chloride 104 96 - 106 mmol/L    CO2 24 20 - 29 mmol/L    Calcium 9.8 8.7 - 10.2 mg/dL    Protein, total 7.2 6.0 - 8.5 g/dL    Albumin 4.3 3.8 - 4.9 g/dL    GLOBULIN, TOTAL 2.9 1.5 - 4.5 g/dL    A-G Ratio 1.5 1.2 - 2.2    Bilirubin, total 0.5 0.0 - 1.2 mg/dL    Alk. phosphatase 94 44 - 121 IU/L    AST (SGOT) 22 0 - 40 IU/L    ALT (SGPT) 24 0 - 32 IU/L       Assessment/Plan:    ICD-10-CM ICD-9-CM    1. Malignant hypertensive heart disease without heart failure  I11.9 402.00       2. Mixed hyperlipidemia  E78.2 272.2       3. Primary osteoarthritis involving multiple joints  M15.9 715.98       4. Menopausal flushing  N95.1 627.2       5. Overweight (BMI 25.0-29. 9)  E66.3 278.02       6. Acute midline thoracic back pain  M54.6 724.1         No orders of the defined types were placed in this encounter.     Cannot display discharge medications since this is not an admission.

## 2022-12-29 NOTE — PROGRESS NOTES
1. \"Have you been to the ER, urgent care clinic since your last visit? Hospitalized since your last visit? \" Yes When: 2 months ago Where: [facility] Reason for visit: had breathing problems ate something that caused it     2. \"Have you seen or consulted any other health care providers outside of the [address] [place] [address] since your last visit? \" No     3. For patients aged 39-70: Has the patient had a colonoscopy / FIT/ Cologuard? No      If the patient is female:    4. For patients aged 41-77: Has the patient had a mammogram within the past 2 years? Yes - Care Gap present. Most recent result on file      5. For patients aged 21-65: Has the patient had a pap smear? Yes - Care Gap present. Most recent result on file       Chief Complaint   Patient presents with    Follow Up Chronic Condition    Back Pain     Visit Vitals  /78 (BP 1 Location: Left upper arm, BP Patient Position: Sitting, BP Cuff Size: Large adult)   Pulse 78   Temp 98.4 °F (36.9 °C) (Oral)   Resp 16   Ht 5' 2\" (1.575 m)   Wt 157 lb 9.6 oz (71.5 kg)   LMP  (LMP Unknown) Comment: partial hysterectomy   SpO2 99%   BMI 28.83 kg/m²     Pt is here for a follow up.

## 2023-06-19 ENCOUNTER — OFFICE VISIT (OUTPATIENT)
Facility: CLINIC | Age: 58
End: 2023-06-19
Payer: COMMERCIAL

## 2023-06-19 VITALS
SYSTOLIC BLOOD PRESSURE: 130 MMHG | HEIGHT: 62 IN | HEART RATE: 71 BPM | TEMPERATURE: 98.2 F | RESPIRATION RATE: 16 BRPM | DIASTOLIC BLOOD PRESSURE: 82 MMHG | WEIGHT: 153.4 LBS | OXYGEN SATURATION: 97 % | BODY MASS INDEX: 28.23 KG/M2

## 2023-06-19 DIAGNOSIS — E78.2 MIXED HYPERLIPIDEMIA: ICD-10-CM

## 2023-06-19 DIAGNOSIS — M77.01 MEDIAL EPICONDYLITIS OF ELBOW, RIGHT: ICD-10-CM

## 2023-06-19 DIAGNOSIS — M15.3 OTHER SECONDARY OSTEOARTHRITIS OF MULTIPLE SITES: ICD-10-CM

## 2023-06-19 DIAGNOSIS — K21.00 GASTROESOPHAGEAL REFLUX DISEASE WITH ESOPHAGITIS WITHOUT HEMORRHAGE: ICD-10-CM

## 2023-06-19 DIAGNOSIS — E78.3 MIXED HYPERGLYCERIDEMIA: ICD-10-CM

## 2023-06-19 DIAGNOSIS — I11.9 MALIGNANT HYPERTENSIVE HEART DISEASE WITHOUT HEART FAILURE: Primary | ICD-10-CM

## 2023-06-19 DIAGNOSIS — M77.8 RIGHT ELBOW TENDONITIS: ICD-10-CM

## 2023-06-19 PROCEDURE — 3079F DIAST BP 80-89 MM HG: CPT | Performed by: FAMILY MEDICINE

## 2023-06-19 PROCEDURE — 3075F SYST BP GE 130 - 139MM HG: CPT | Performed by: FAMILY MEDICINE

## 2023-06-19 PROCEDURE — 99214 OFFICE O/P EST MOD 30 MIN: CPT | Performed by: FAMILY MEDICINE

## 2023-06-19 RX ORDER — PRAVASTATIN SODIUM 40 MG
TABLET ORAL
Qty: 90 TABLET | Refills: 0 | Status: SHIPPED | OUTPATIENT
Start: 2023-06-19

## 2023-06-19 RX ORDER — AMLODIPINE BESYLATE 5 MG/1
5 TABLET ORAL DAILY
Qty: 90 TABLET | Refills: 0 | Status: SHIPPED | OUTPATIENT
Start: 2023-06-19

## 2023-06-19 RX ORDER — DICLOFENAC SODIUM 75 MG/1
75 TABLET, DELAYED RELEASE ORAL 2 TIMES DAILY PRN
Qty: 30 TABLET | Refills: 0 | Status: SHIPPED | OUTPATIENT
Start: 2023-06-19 | End: 2023-06-23

## 2023-06-19 SDOH — ECONOMIC STABILITY: INCOME INSECURITY: HOW HARD IS IT FOR YOU TO PAY FOR THE VERY BASICS LIKE FOOD, HOUSING, MEDICAL CARE, AND HEATING?: NOT HARD AT ALL

## 2023-06-19 SDOH — ECONOMIC STABILITY: HOUSING INSECURITY
IN THE LAST 12 MONTHS, WAS THERE A TIME WHEN YOU DID NOT HAVE A STEADY PLACE TO SLEEP OR SLEPT IN A SHELTER (INCLUDING NOW)?: NO

## 2023-06-19 SDOH — ECONOMIC STABILITY: FOOD INSECURITY: WITHIN THE PAST 12 MONTHS, YOU WORRIED THAT YOUR FOOD WOULD RUN OUT BEFORE YOU GOT MONEY TO BUY MORE.: NEVER TRUE

## 2023-06-19 SDOH — ECONOMIC STABILITY: FOOD INSECURITY: WITHIN THE PAST 12 MONTHS, THE FOOD YOU BOUGHT JUST DIDN'T LAST AND YOU DIDN'T HAVE MONEY TO GET MORE.: NEVER TRUE

## 2023-06-19 ASSESSMENT — PATIENT HEALTH QUESTIONNAIRE - PHQ9
2. FEELING DOWN, DEPRESSED OR HOPELESS: 0
1. LITTLE INTEREST OR PLEASURE IN DOING THINGS: 0
SUM OF ALL RESPONSES TO PHQ QUESTIONS 1-9: 0
SUM OF ALL RESPONSES TO PHQ9 QUESTIONS 1 & 2: 0
SUM OF ALL RESPONSES TO PHQ QUESTIONS 1-9: 0

## 2023-06-19 NOTE — PROGRESS NOTES
Juan J Oden is a 62 y.o. female and presents with Follow-up Chronic Condition and Numbness (Elbow )  . Neuropathy     with a hx of HTN and Diabetes    Subjective:  Cardiovascular Review:  The patient has hypertension   Diet and Lifestyle: generally follows a low fat low cholesterol diet, generally follows a low sodium diet, exercises sporadically  Home BP Monitoring: is not measured at home. Pertinent ROS: taking medications as instructed, no medication side effects noted, no TIA's, no chest pain on exertion, no dyspnea on exertion, no swelling of ankles.      Review of Systems  Review of Systems - Musculoskeletal ROS: positive for - pain in right elbow       Past Medical History:   Diagnosis Date    Allergies     Back pain     GERD (gastroesophageal reflux disease)     Headache     History of throat problem     Hypercholesterolemia     Hypertension     Sinus problem      Past Surgical History:   Procedure Laterality Date    GYN      ORTHOPEDIC SURGERY Left 2001    knee    PARTIAL HYSTERECTOMY (CERVIX NOT REMOVED)      TUBAL LIGATION  1993     Social History     Socioeconomic History    Marital status:      Spouse name: None    Number of children: None    Years of education: None    Highest education level: None   Tobacco Use    Smoking status: Never    Smokeless tobacco: Never   Substance and Sexual Activity    Alcohol use: Yes    Drug use: Never     Social Determinants of Health     Financial Resource Strain: Low Risk     Difficulty of Paying Living Expenses: Not hard at all   Food Insecurity: No Food Insecurity    Worried About Running Out of Food in the Last Year: Never true    Ran Out of Food in the Last Year: Never true   Transportation Needs: Unknown    Lack of Transportation (Non-Medical): No   Housing Stability: Unknown    Unstable Housing in the Last Year: No     Family History   Problem Relation Age of Onset    Ovarian Cancer Maternal Aunt     Hypertension Father      Current Outpatient

## 2023-06-23 RX ORDER — DICLOFENAC SODIUM 75 MG/1
TABLET, DELAYED RELEASE ORAL
Qty: 30 TABLET | Refills: 0 | Status: SHIPPED | OUTPATIENT
Start: 2023-06-23

## 2023-06-29 RX ORDER — AMLODIPINE BESYLATE 5 MG/1
5 TABLET ORAL DAILY
Qty: 90 TABLET | Refills: 0 | Status: SHIPPED | OUTPATIENT
Start: 2023-06-29

## 2023-06-29 RX ORDER — AMLODIPINE BESYLATE 5 MG/1
TABLET ORAL
Qty: 30 TABLET | Refills: 0 | OUTPATIENT
Start: 2023-06-29 | End: 2023-07-29

## 2023-07-19 ENCOUNTER — OFFICE VISIT (OUTPATIENT)
Facility: CLINIC | Age: 58
End: 2023-07-19
Payer: COMMERCIAL

## 2023-07-19 VITALS
SYSTOLIC BLOOD PRESSURE: 134 MMHG | RESPIRATION RATE: 16 BRPM | HEART RATE: 49 BPM | OXYGEN SATURATION: 98 % | BODY MASS INDEX: 27.82 KG/M2 | DIASTOLIC BLOOD PRESSURE: 81 MMHG | WEIGHT: 151.2 LBS | HEIGHT: 62 IN | TEMPERATURE: 99 F

## 2023-07-19 DIAGNOSIS — M79.675 TOE PAIN, LEFT: Primary | ICD-10-CM

## 2023-07-19 DIAGNOSIS — L03.032 CELLULITIS OF FOURTH TOE OF LEFT FOOT: ICD-10-CM

## 2023-07-19 PROCEDURE — 3075F SYST BP GE 130 - 139MM HG: CPT | Performed by: FAMILY MEDICINE

## 2023-07-19 PROCEDURE — 99213 OFFICE O/P EST LOW 20 MIN: CPT | Performed by: FAMILY MEDICINE

## 2023-07-19 PROCEDURE — 3079F DIAST BP 80-89 MM HG: CPT | Performed by: FAMILY MEDICINE

## 2023-07-19 RX ORDER — DOXYCYCLINE HYCLATE 100 MG
100 TABLET ORAL 2 TIMES DAILY
Qty: 14 TABLET | Refills: 0 | Status: SHIPPED | OUTPATIENT
Start: 2023-07-19 | End: 2023-07-26

## 2023-07-19 NOTE — PROGRESS NOTES
Felisa Aly is a 62 y.o. female and presents with Toe Pain (Left middle toe is swollen )  . Toe Pain    with a hx of HTN   62 y.o. Patient here with c/o left middle toe swelling x 1 day with no injury and last pedicure 2 weeks ago. Admits to pain    Subjective:  Cardiovascular Review:  The patient has hypertension   Diet and Lifestyle: generally follows a low fat low cholesterol diet, generally follows a low sodium diet, exercises sporadically  Home BP Monitoring: is not measured at home. Pertinent ROS: taking medications as instructed, no medication side effects noted, no TIA's, no chest pain on exertion, no dyspnea on exertion, no swelling of ankles.      Review of Systems  Review of Systems - Dermatological ROS: positive for - skin lesion changes       Past Medical History:   Diagnosis Date    Allergies     Back pain     GERD (gastroesophageal reflux disease)     Headache     History of throat problem     Hypercholesterolemia     Hypertension     Sinus problem      Past Surgical History:   Procedure Laterality Date    GYN      ORTHOPEDIC SURGERY Left 2001    knee    PARTIAL HYSTERECTOMY (CERVIX NOT REMOVED)      TUBAL LIGATION  1993     Social History     Socioeconomic History    Marital status:      Spouse name: None    Number of children: None    Years of education: None    Highest education level: None   Tobacco Use    Smoking status: Never    Smokeless tobacco: Never   Substance and Sexual Activity    Alcohol use: Yes    Drug use: Never     Social Determinants of Health     Financial Resource Strain: Low Risk     Difficulty of Paying Living Expenses: Not hard at all   Food Insecurity: No Food Insecurity    Worried About Running Out of Food in the Last Year: Never true    Ran Out of Food in the Last Year: Never true   Transportation Needs: Unknown    Lack of Transportation (Non-Medical): No   Housing Stability: Unknown    Unstable Housing in the Last Year: No     Family History   Problem Relation

## 2023-08-22 ENCOUNTER — CLINICAL DOCUMENTATION (OUTPATIENT)
Facility: CLINIC | Age: 58
End: 2023-08-22

## 2023-08-22 LAB
BUN SERPL-MCNC: 16 MG/DL (ref 6–24)
BUN/CREAT SERPL: 20 (ref 9–23)
CALCIUM SERPL-MCNC: 9.9 MG/DL (ref 8.7–10.2)
CHLORIDE SERPL-SCNC: 103 MMOL/L (ref 96–106)
CHOLEST SERPL-MCNC: 233 MG/DL (ref 100–199)
CO2 SERPL-SCNC: 22 MMOL/L (ref 20–29)
CREAT SERPL-MCNC: 0.79 MG/DL (ref 0.57–1)
EGFRCR SERPLBLD CKD-EPI 2021: 87 ML/MIN/1.73
GLUCOSE SERPL-MCNC: 91 MG/DL (ref 70–99)
HDLC SERPL-MCNC: 62 MG/DL
LDLC SERPL CALC-MCNC: 151 MG/DL (ref 0–99)
POTASSIUM SERPL-SCNC: 4.3 MMOL/L (ref 3.5–5.2)
SODIUM SERPL-SCNC: 142 MMOL/L (ref 134–144)
TRIGL SERPL-MCNC: 111 MG/DL (ref 0–149)
VLDLC SERPL CALC-MCNC: 20 MG/DL (ref 5–40)

## 2023-09-07 ENCOUNTER — TRANSCRIBE ORDERS (OUTPATIENT)
Facility: HOSPITAL | Age: 58
End: 2023-09-07

## 2023-09-07 DIAGNOSIS — Z12.31 VISIT FOR SCREENING MAMMOGRAM: Primary | ICD-10-CM

## 2023-09-19 ENCOUNTER — OFFICE VISIT (OUTPATIENT)
Facility: CLINIC | Age: 58
End: 2023-09-19
Payer: COMMERCIAL

## 2023-09-19 VITALS
RESPIRATION RATE: 18 BRPM | TEMPERATURE: 98 F | SYSTOLIC BLOOD PRESSURE: 128 MMHG | BODY MASS INDEX: 29.08 KG/M2 | OXYGEN SATURATION: 98 % | DIASTOLIC BLOOD PRESSURE: 72 MMHG | HEIGHT: 62 IN | HEART RATE: 71 BPM | WEIGHT: 158 LBS

## 2023-09-19 DIAGNOSIS — I11.9 MALIGNANT HYPERTENSIVE HEART DISEASE WITHOUT HEART FAILURE: Primary | ICD-10-CM

## 2023-09-19 DIAGNOSIS — M79.675 TOE PAIN, LEFT: ICD-10-CM

## 2023-09-19 DIAGNOSIS — Z28.21 REFUSED INFLUENZA VACCINE: ICD-10-CM

## 2023-09-19 DIAGNOSIS — K21.00 GASTROESOPHAGEAL REFLUX DISEASE WITH ESOPHAGITIS WITHOUT HEMORRHAGE: ICD-10-CM

## 2023-09-19 DIAGNOSIS — M15.3 OTHER SECONDARY OSTEOARTHRITIS OF MULTIPLE SITES: ICD-10-CM

## 2023-09-19 DIAGNOSIS — E78.2 MIXED HYPERLIPIDEMIA: ICD-10-CM

## 2023-09-19 PROCEDURE — 3078F DIAST BP <80 MM HG: CPT | Performed by: FAMILY MEDICINE

## 2023-09-19 PROCEDURE — 99214 OFFICE O/P EST MOD 30 MIN: CPT | Performed by: FAMILY MEDICINE

## 2023-09-19 PROCEDURE — 3074F SYST BP LT 130 MM HG: CPT | Performed by: FAMILY MEDICINE

## 2023-09-19 SDOH — ECONOMIC STABILITY: FOOD INSECURITY: WITHIN THE PAST 12 MONTHS, THE FOOD YOU BOUGHT JUST DIDN'T LAST AND YOU DIDN'T HAVE MONEY TO GET MORE.: NEVER TRUE

## 2023-09-19 SDOH — ECONOMIC STABILITY: INCOME INSECURITY: HOW HARD IS IT FOR YOU TO PAY FOR THE VERY BASICS LIKE FOOD, HOUSING, MEDICAL CARE, AND HEATING?: NOT HARD AT ALL

## 2023-09-19 SDOH — ECONOMIC STABILITY: FOOD INSECURITY: WITHIN THE PAST 12 MONTHS, YOU WORRIED THAT YOUR FOOD WOULD RUN OUT BEFORE YOU GOT MONEY TO BUY MORE.: NEVER TRUE

## 2023-09-19 ASSESSMENT — PATIENT HEALTH QUESTIONNAIRE - PHQ9
1. LITTLE INTEREST OR PLEASURE IN DOING THINGS: 0
2. FEELING DOWN, DEPRESSED OR HOPELESS: 0
SUM OF ALL RESPONSES TO PHQ QUESTIONS 1-9: 0
SUM OF ALL RESPONSES TO PHQ9 QUESTIONS 1 & 2: 0
SUM OF ALL RESPONSES TO PHQ QUESTIONS 1-9: 0

## 2023-09-19 ASSESSMENT — ANXIETY QUESTIONNAIRES
7. FEELING AFRAID AS IF SOMETHING AWFUL MIGHT HAPPEN: 0
6. BECOMING EASILY ANNOYED OR IRRITABLE: 0
4. TROUBLE RELAXING: 0
1. FEELING NERVOUS, ANXIOUS, OR ON EDGE: 0
5. BEING SO RESTLESS THAT IT IS HARD TO SIT STILL: 0
GAD7 TOTAL SCORE: 0
3. WORRYING TOO MUCH ABOUT DIFFERENT THINGS: 0
IF YOU CHECKED OFF ANY PROBLEMS ON THIS QUESTIONNAIRE, HOW DIFFICULT HAVE THESE PROBLEMS MADE IT FOR YOU TO DO YOUR WORK, TAKE CARE OF THINGS AT HOME, OR GET ALONG WITH OTHER PEOPLE: NOT DIFFICULT AT ALL
2. NOT BEING ABLE TO STOP OR CONTROL WORRYING: 0

## 2023-09-19 NOTE — PROGRESS NOTES
Odilia Power is a 62 y.o. female and presents with Follow-up, Hypertension, and Toe Pain (Toe pain is still there on left foot)  . Hypertension    Toe Pain      with a hx of HTN and Hyperlipidemia  62 y.o. Patient here on a routine follow up with no recent symptoms except    Subjective:  Cardiovascular Review:  The patient has hypertension   Diet and Lifestyle: generally follows a low fat low cholesterol diet, generally follows a low sodium diet, exercises sporadically  Home BP Monitoring: is not measured at home. Pertinent ROS: taking medications as instructed, no medication side effects noted, no TIA's, no chest pain on exertion, no dyspnea on exertion, no swelling of ankles.      Review of Systems  Review of Systems - Musculoskeletal ROS: positive for - pain in right 2nd toe       Past Medical History:   Diagnosis Date    Allergies     Back pain     GERD (gastroesophageal reflux disease)     Headache     History of throat problem     Hypercholesterolemia     Hypertension     Sinus problem      Past Surgical History:   Procedure Laterality Date    GYN      ORTHOPEDIC SURGERY Left 2001    knee    PARTIAL HYSTERECTOMY (CERVIX NOT REMOVED)      TUBAL LIGATION  1993     Social History     Socioeconomic History    Marital status:      Spouse name: None    Number of children: None    Years of education: None    Highest education level: None   Tobacco Use    Smoking status: Never    Smokeless tobacco: Never   Substance and Sexual Activity    Alcohol use: Yes    Drug use: Never     Social Determinants of Health     Financial Resource Strain: Low Risk  (9/19/2023)    Overall Financial Resource Strain (CARDIA)     Difficulty of Paying Living Expenses: Not hard at all   Food Insecurity: No Food Insecurity (9/19/2023)    Hunger Vital Sign     Worried About Running Out of Food in the Last Year: Never true     Ran Out of Food in the Last Year: Never true   Transportation Needs: Unknown (9/19/2023)    PRAPARE -

## 2023-09-20 ENCOUNTER — HOSPITAL ENCOUNTER (OUTPATIENT)
Facility: HOSPITAL | Age: 58
Discharge: HOME | End: 2023-09-23
Attending: FAMILY MEDICINE
Payer: COMMERCIAL

## 2023-09-20 DIAGNOSIS — Z12.31 VISIT FOR SCREENING MAMMOGRAM: ICD-10-CM

## 2023-09-20 PROCEDURE — 77063 BREAST TOMOSYNTHESIS BI: CPT

## 2023-10-06 RX ORDER — AMLODIPINE BESYLATE 5 MG/1
5 TABLET ORAL DAILY
Qty: 30 TABLET | Refills: 2 | Status: SHIPPED | OUTPATIENT
Start: 2023-10-06

## 2023-10-24 ENCOUNTER — OFFICE VISIT (OUTPATIENT)
Facility: CLINIC | Age: 58
End: 2023-10-24
Payer: COMMERCIAL

## 2023-10-24 VITALS
HEIGHT: 62 IN | WEIGHT: 157.2 LBS | BODY MASS INDEX: 28.93 KG/M2 | OXYGEN SATURATION: 99 % | DIASTOLIC BLOOD PRESSURE: 70 MMHG | SYSTOLIC BLOOD PRESSURE: 128 MMHG | HEART RATE: 85 BPM | TEMPERATURE: 97.8 F | RESPIRATION RATE: 18 BRPM

## 2023-10-24 DIAGNOSIS — M79.675 TOE PAIN, CHRONIC, LEFT: Primary | ICD-10-CM

## 2023-10-24 DIAGNOSIS — G89.29 TOE PAIN, CHRONIC, LEFT: Primary | ICD-10-CM

## 2023-10-24 PROCEDURE — 3078F DIAST BP <80 MM HG: CPT | Performed by: FAMILY MEDICINE

## 2023-10-24 PROCEDURE — 3074F SYST BP LT 130 MM HG: CPT | Performed by: FAMILY MEDICINE

## 2023-10-24 PROCEDURE — 99213 OFFICE O/P EST LOW 20 MIN: CPT | Performed by: FAMILY MEDICINE

## 2023-10-24 RX ORDER — DICLOFENAC SODIUM 75 MG/1
TABLET, DELAYED RELEASE ORAL
Qty: 20 TABLET | Refills: 0 | Status: SHIPPED | OUTPATIENT
Start: 2023-10-24

## 2023-10-24 SDOH — ECONOMIC STABILITY: FOOD INSECURITY: WITHIN THE PAST 12 MONTHS, THE FOOD YOU BOUGHT JUST DIDN'T LAST AND YOU DIDN'T HAVE MONEY TO GET MORE.: NEVER TRUE

## 2023-10-24 SDOH — ECONOMIC STABILITY: FOOD INSECURITY: WITHIN THE PAST 12 MONTHS, YOU WORRIED THAT YOUR FOOD WOULD RUN OUT BEFORE YOU GOT MONEY TO BUY MORE.: NEVER TRUE

## 2023-10-24 SDOH — ECONOMIC STABILITY: INCOME INSECURITY: HOW HARD IS IT FOR YOU TO PAY FOR THE VERY BASICS LIKE FOOD, HOUSING, MEDICAL CARE, AND HEATING?: NOT HARD AT ALL

## 2023-10-24 ASSESSMENT — ANXIETY QUESTIONNAIRES
1. FEELING NERVOUS, ANXIOUS, OR ON EDGE: 0
7. FEELING AFRAID AS IF SOMETHING AWFUL MIGHT HAPPEN: 0
4. TROUBLE RELAXING: 0
2. NOT BEING ABLE TO STOP OR CONTROL WORRYING: 0
GAD7 TOTAL SCORE: 0
5. BEING SO RESTLESS THAT IT IS HARD TO SIT STILL: 0
IF YOU CHECKED OFF ANY PROBLEMS ON THIS QUESTIONNAIRE, HOW DIFFICULT HAVE THESE PROBLEMS MADE IT FOR YOU TO DO YOUR WORK, TAKE CARE OF THINGS AT HOME, OR GET ALONG WITH OTHER PEOPLE: NOT DIFFICULT AT ALL
6. BECOMING EASILY ANNOYED OR IRRITABLE: 0
3. WORRYING TOO MUCH ABOUT DIFFERENT THINGS: 0

## 2023-10-24 ASSESSMENT — PATIENT HEALTH QUESTIONNAIRE - PHQ9
SUM OF ALL RESPONSES TO PHQ QUESTIONS 1-9: 0
SUM OF ALL RESPONSES TO PHQ QUESTIONS 1-9: 0
2. FEELING DOWN, DEPRESSED OR HOPELESS: 0
SUM OF ALL RESPONSES TO PHQ9 QUESTIONS 1 & 2: 0
SUM OF ALL RESPONSES TO PHQ QUESTIONS 1-9: 0
1. LITTLE INTEREST OR PLEASURE IN DOING THINGS: 0
SUM OF ALL RESPONSES TO PHQ QUESTIONS 1-9: 0

## 2023-11-07 RX ORDER — PRAVASTATIN SODIUM 40 MG
TABLET ORAL
Qty: 90 TABLET | Refills: 0 | Status: SHIPPED | OUTPATIENT
Start: 2023-11-07

## 2023-12-19 PROBLEM — Z12.11 COLON CANCER SCREENING: Status: ACTIVE | Noted: 2023-12-19

## 2024-01-18 PROBLEM — Z12.11 COLON CANCER SCREENING: Status: RESOLVED | Noted: 2023-12-19 | Resolved: 2024-01-18

## 2024-01-26 ENCOUNTER — PREP FOR PROCEDURE (OUTPATIENT)
Age: 59
End: 2024-01-26

## 2024-01-26 ENCOUNTER — TELEPHONE (OUTPATIENT)
Age: 59
End: 2024-01-26

## 2024-01-26 DIAGNOSIS — Z12.11 COLON CANCER SCREENING: ICD-10-CM

## 2024-01-26 NOTE — TELEPHONE ENCOUNTER
Called patient regarding scheduling surgery with Dr. De La Rosa for colonoscopy. Informed patient prep information will be sent out via mail. Patient understood.

## 2024-02-01 RX ORDER — PRAVASTATIN SODIUM 40 MG
TABLET ORAL
Qty: 90 TABLET | Refills: 0 | Status: SHIPPED | OUTPATIENT
Start: 2024-02-01

## 2024-02-08 RX ORDER — AMLODIPINE BESYLATE 5 MG/1
5 TABLET ORAL DAILY
Qty: 30 TABLET | Refills: 2 | Status: SHIPPED | OUTPATIENT
Start: 2024-02-08

## 2024-02-13 RX ORDER — POLYETHYLENE GLYCOL 3350, SODIUM SULFATE ANHYDROUS, SODIUM BICARBONATE, SODIUM CHLORIDE, POTASSIUM CHLORIDE 236; 22.74; 6.74; 5.86; 2.97 G/4L; G/4L; G/4L; G/4L; G/4L
4 POWDER, FOR SOLUTION ORAL ONCE
Qty: 4000 ML | Refills: 0 | Status: SHIPPED | OUTPATIENT
Start: 2024-02-13 | End: 2024-02-13

## 2024-02-19 NOTE — PRE-PROCEDURE INSTRUCTIONS
PAT completed with pt over the phone. Pt informed to arrive at sameday surgery at 0645. All question answered at this time.

## 2024-02-20 ENCOUNTER — HOSPITAL ENCOUNTER (OUTPATIENT)
Facility: HOSPITAL | Age: 59
Discharge: HOME | End: 2024-02-20
Attending: COLON & RECTAL SURGERY | Admitting: COLON & RECTAL SURGERY
Payer: COMMERCIAL

## 2024-02-20 ENCOUNTER — ANESTHESIA EVENT (OUTPATIENT)
Facility: HOSPITAL | Age: 59
End: 2024-02-20
Payer: COMMERCIAL

## 2024-02-20 ENCOUNTER — ANESTHESIA (OUTPATIENT)
Facility: HOSPITAL | Age: 59
End: 2024-02-20
Payer: COMMERCIAL

## 2024-02-20 VITALS
OXYGEN SATURATION: 97 % | SYSTOLIC BLOOD PRESSURE: 129 MMHG | WEIGHT: 156 LBS | HEIGHT: 63 IN | BODY MASS INDEX: 27.64 KG/M2 | TEMPERATURE: 96.9 F | HEART RATE: 82 BPM | DIASTOLIC BLOOD PRESSURE: 84 MMHG | RESPIRATION RATE: 18 BRPM

## 2024-02-20 DIAGNOSIS — Z12.11 COLON CANCER SCREENING: ICD-10-CM

## 2024-02-20 PROCEDURE — 2500000003 HC RX 250 WO HCPCS: Performed by: NURSE ANESTHETIST, CERTIFIED REGISTERED

## 2024-02-20 PROCEDURE — 7100000010 HC PHASE II RECOVERY - FIRST 15 MIN: Performed by: COLON & RECTAL SURGERY

## 2024-02-20 PROCEDURE — 2709999900 HC NON-CHARGEABLE SUPPLY: Performed by: COLON & RECTAL SURGERY

## 2024-02-20 PROCEDURE — 3700000001 HC ADD 15 MINUTES (ANESTHESIA): Performed by: COLON & RECTAL SURGERY

## 2024-02-20 PROCEDURE — 2580000003 HC RX 258: Performed by: COLON & RECTAL SURGERY

## 2024-02-20 PROCEDURE — 3700000000 HC ANESTHESIA ATTENDED CARE: Performed by: COLON & RECTAL SURGERY

## 2024-02-20 PROCEDURE — 88305 TISSUE EXAM BY PATHOLOGIST: CPT

## 2024-02-20 PROCEDURE — 3600007502: Performed by: COLON & RECTAL SURGERY

## 2024-02-20 PROCEDURE — 6360000002 HC RX W HCPCS: Performed by: NURSE ANESTHETIST, CERTIFIED REGISTERED

## 2024-02-20 PROCEDURE — 7100000011 HC PHASE II RECOVERY - ADDTL 15 MIN: Performed by: COLON & RECTAL SURGERY

## 2024-02-20 PROCEDURE — 3600007512: Performed by: COLON & RECTAL SURGERY

## 2024-02-20 RX ORDER — LIDOCAINE HYDROCHLORIDE 20 MG/ML
INJECTION, SOLUTION EPIDURAL; INFILTRATION; INTRACAUDAL; PERINEURAL PRN
Status: DISCONTINUED | OUTPATIENT
Start: 2024-02-20 | End: 2024-02-20 | Stop reason: SDUPTHER

## 2024-02-20 RX ORDER — PROPOFOL 10 MG/ML
INJECTION, EMULSION INTRAVENOUS
Status: COMPLETED
Start: 2024-02-20 | End: 2024-02-20

## 2024-02-20 RX ORDER — GLYCOPYRROLATE 0.2 MG/ML
INJECTION INTRAMUSCULAR; INTRAVENOUS PRN
Status: DISCONTINUED | OUTPATIENT
Start: 2024-02-20 | End: 2024-02-20 | Stop reason: SDUPTHER

## 2024-02-20 RX ORDER — LIDOCAINE HYDROCHLORIDE 20 MG/ML
INJECTION, SOLUTION EPIDURAL; INFILTRATION; INTRACAUDAL; PERINEURAL
Status: COMPLETED
Start: 2024-02-20 | End: 2024-02-20

## 2024-02-20 RX ORDER — SODIUM CHLORIDE, SODIUM LACTATE, POTASSIUM CHLORIDE, CALCIUM CHLORIDE 600; 310; 30; 20 MG/100ML; MG/100ML; MG/100ML; MG/100ML
INJECTION, SOLUTION INTRAVENOUS CONTINUOUS
Status: DISCONTINUED | OUTPATIENT
Start: 2024-02-20 | End: 2024-02-20 | Stop reason: HOSPADM

## 2024-02-20 RX ORDER — GLYCOPYRROLATE 0.2 MG/ML
INJECTION INTRAMUSCULAR; INTRAVENOUS
Status: COMPLETED
Start: 2024-02-20 | End: 2024-02-20

## 2024-02-20 RX ADMIN — PROPOFOL 20 MG: 10 INJECTION, EMULSION INTRAVENOUS at 08:43

## 2024-02-20 RX ADMIN — GLYCOPYRROLATE 0.2 MG: 0.2 INJECTION, SOLUTION INTRAMUSCULAR; INTRAVENOUS at 08:34

## 2024-02-20 RX ADMIN — SODIUM CHLORIDE, POTASSIUM CHLORIDE, SODIUM LACTATE AND CALCIUM CHLORIDE: 600; 310; 30; 20 INJECTION, SOLUTION INTRAVENOUS at 07:54

## 2024-02-20 RX ADMIN — PROPOFOL 20 MG: 10 INJECTION, EMULSION INTRAVENOUS at 08:52

## 2024-02-20 RX ADMIN — PROPOFOL 20 MG: 10 INJECTION, EMULSION INTRAVENOUS at 08:46

## 2024-02-20 RX ADMIN — PROPOFOL 80 MG: 10 INJECTION, EMULSION INTRAVENOUS at 08:39

## 2024-02-20 RX ADMIN — PROPOFOL 30 MG: 10 INJECTION, EMULSION INTRAVENOUS at 08:50

## 2024-02-20 RX ADMIN — LIDOCAINE HYDROCHLORIDE 80 MG: 20 INJECTION, SOLUTION EPIDURAL; INFILTRATION; INTRACAUDAL; PERINEURAL at 08:39

## 2024-02-20 RX ADMIN — PROPOFOL 20 MG: 10 INJECTION, EMULSION INTRAVENOUS at 08:48

## 2024-02-20 RX ADMIN — PROPOFOL 30 MG: 10 INJECTION, EMULSION INTRAVENOUS at 08:55

## 2024-02-20 RX ADMIN — PROPOFOL 20 MG: 10 INJECTION, EMULSION INTRAVENOUS at 08:44

## 2024-02-20 RX ADMIN — PROPOFOL 30 MG: 10 INJECTION, EMULSION INTRAVENOUS at 08:41

## 2024-02-20 ASSESSMENT — PAIN - FUNCTIONAL ASSESSMENT
PAIN_FUNCTIONAL_ASSESSMENT: NONE - DENIES PAIN
PAIN_FUNCTIONAL_ASSESSMENT: 0-10
PAIN_FUNCTIONAL_ASSESSMENT: NONE - DENIES PAIN

## 2024-02-20 NOTE — ANESTHESIA PRE PROCEDURE
found for: \"LABABO\", \"LABRH\"    Drug/Infectious Status (If Applicable):  No results found for: \"HIV\", \"HEPCAB\"    COVID-19 Screening (If Applicable): No results found for: \"COVID19\"        Anesthesia Evaluation  Patient summary reviewed and Nursing notes reviewed  Airway: Mallampati: II  TM distance: >3 FB   Neck ROM: full  Mouth opening: > = 3 FB   Dental: normal exam         Pulmonary:Negative Pulmonary ROS and normal exam  breath sounds clear to auscultation                             Cardiovascular:    (+) hypertension:, hyperlipidemia        Rhythm: regular  Rate: normal                    Neuro/Psych:   (+) headaches: migraine headaches            GI/Hepatic/Renal:   (+) GERD:          Endo/Other:                     Abdominal:             Vascular:          Other Findings:       Anesthesia Plan      MAC and TIVA     ASA 2     (Standard ASA monitors: continuous EKG, BP, HR, pulse oximeter, and capnography.)  Induction: intravenous.      Anesthetic plan and risks discussed with patient (and family, if present.).      Plan discussed with CRNA.                Cyndi Lin MD   2/20/2024

## 2024-02-20 NOTE — PERIOP NOTE
Discharged via wheelchair no signs or symptoms of distress noted. IV d/abril gauze and bandaid applied. Discharge instructions reviewed without questions,  in attendance.

## 2024-02-20 NOTE — ANESTHESIA POSTPROCEDURE EVALUATION
Department of Anesthesiology  Postprocedure Note    Patient: Emir Liang  MRN: [id number]  YOB: 1965  Date of evaluation: 2/20/2024    Procedure Summary       Date: 02/20/24 Room / Location: [facility] ENDO 02 / [facility] ENDOSCOPY    Anesthesia Start: 0831 Anesthesia Stop: 0858    Procedures:       COLONOSCOPY (Lower GI Region)      COLONOSCOPY WITH BIOPSY (Lower GI Region) Diagnosis:       Colon cancer screening      (Colon cancer screening [Z12.11])    Surgeons: Lucía De La Rosa MD Responsible Provider: Cyndi Lin MD    Anesthesia Type: MAC ASA Status: 2            Anesthesia Type: MAC    Kwadwo Phase I: Kwadwo Score: 10    Kwadwo Phase II:      Anesthesia Post Evaluation    Patient location during evaluation: bedside  Patient participation: waiting for patient participation  Level of consciousness: sleepy but conscious  Pain score: 0  Airway patency: patent  Nausea & Vomiting: no vomiting and no nausea  Cardiovascular status: blood pressure returned to baseline  Respiratory status: acceptable  Hydration status: stable  Pain management: adequate    No notable events documented.

## 2024-02-25 PROBLEM — Z12.11 COLON CANCER SCREENING: Status: RESOLVED | Noted: 2024-01-26 | Resolved: 2024-02-25

## 2024-04-18 ENCOUNTER — OFFICE VISIT (OUTPATIENT)
Facility: CLINIC | Age: 59
End: 2024-04-18
Payer: COMMERCIAL

## 2024-04-18 VITALS
BODY MASS INDEX: 28.34 KG/M2 | SYSTOLIC BLOOD PRESSURE: 151 MMHG | TEMPERATURE: 97.8 F | WEIGHT: 154 LBS | RESPIRATION RATE: 16 BRPM | HEART RATE: 67 BPM | OXYGEN SATURATION: 100 % | DIASTOLIC BLOOD PRESSURE: 82 MMHG | HEIGHT: 62 IN

## 2024-04-18 DIAGNOSIS — J32.9 VIRAL SINUSITIS: Primary | ICD-10-CM

## 2024-04-18 DIAGNOSIS — B97.89 VIRAL SINUSITIS: Primary | ICD-10-CM

## 2024-04-18 PROCEDURE — 3077F SYST BP >= 140 MM HG: CPT

## 2024-04-18 PROCEDURE — 99213 OFFICE O/P EST LOW 20 MIN: CPT

## 2024-04-18 PROCEDURE — 3079F DIAST BP 80-89 MM HG: CPT

## 2024-04-18 RX ORDER — AZITHROMYCIN 250 MG/1
TABLET, FILM COATED ORAL
Qty: 6 TABLET | Refills: 0 | Status: SHIPPED | OUTPATIENT
Start: 2024-04-18 | End: 2024-04-28

## 2024-04-18 RX ORDER — CETIRIZINE HYDROCHLORIDE 10 MG/1
10 TABLET ORAL DAILY
Qty: 90 TABLET | Refills: 0 | Status: SHIPPED | OUTPATIENT
Start: 2024-04-18

## 2024-04-18 RX ORDER — OLOPATADINE HYDROCHLORIDE 2 MG/ML
1 SOLUTION/ DROPS OPHTHALMIC DAILY
Qty: 2.5 ML | Refills: 0 | Status: SHIPPED | OUTPATIENT
Start: 2024-04-18 | End: 2024-05-02

## 2024-04-18 ASSESSMENT — ENCOUNTER SYMPTOMS
SHORTNESS OF BREATH: 0
VOMITING: 0
BLOOD IN STOOL: 0
PHOTOPHOBIA: 0
EYE REDNESS: 1
BACK PAIN: 0
COUGH: 0
NAUSEA: 0
WHEEZING: 0
ABDOMINAL PAIN: 0
SORE THROAT: 0
DIARRHEA: 0
EYE PAIN: 0
CHEST TIGHTNESS: 0
TROUBLE SWALLOWING: 0
CONSTIPATION: 0

## 2024-04-18 NOTE — PROGRESS NOTES
Emir Liang  58 y.o. female  1965  [address] [address]  [id number]     [place] [facility]: Progress Note       Encounter Date: 4/18/2024    Patient presents with the following chief complaint(s)    Chief Complaint   Patient presents with    Facial Swelling    Epistaxis        History provided by patient    Assessment and Plan:   1. Viral sinusitis  -     cetirizine (ZYRTEC) 10 MG tablet; Take 1 tablet by mouth daily, Disp-90 tablet, R-0Normal  -     olopatadine (PATADAY) 0.2 % SOLN ophthalmic solution; Place 1 drop into the left eye daily for 14 days, Disp-2.5 mL, R-0Normal  -     azithromycin (ZITHROMAX) 250 MG tablet; 500mg on day 1 followed by 250mg on days 2 - 5, Disp-6 tablet, R-0Normal             No follow-up provider specified.  History of Present Illness   Emir Liang is a 58 y.o. female with past medical history listed, who presents to clinic today for an acute problem visit.    She is a pt of Dr. Andre    Pt says her eye swollen since yesterday after being outside. Tried Benadryl to help. Thinks symptoms are allergy related.   Has yellow nasal mucus, no bleeding today., but thinks she has seen bleeding in the past. Report ear fullness.   Denies fever, chills, N/V, myalgias.       Health Maintenance  Health Maintenance Due   Topic Date Due    Hepatitis B vaccine (1 of 3 - 3-dose series) Never done    HIV screen  Never done    Hepatitis C screen  Never done    DTaP/Tdap/Td vaccine (1 - Tdap) Never done    Shingles vaccine (1 of 2) Never done    COVID-19 Vaccine (3 - 2023-24 season) 09/01/2023       Vitals:     Vitals:    04/18/24 0920   BP: (!) 151/82   Pulse: 67   Resp: 16   Temp: 97.8 °F (36.6 °C)   SpO2: 100%   Weight: 69.9 kg (154 lb)   Height: 1.575 m (5' 2\")     Body mass index is 28.17 kg/m².    Wt Readings from Last 3 Encounters:   04/18/24 69.9 kg (154 lb)   02/19/24 70.8 kg (156 lb)   12/19/23 73.8 kg (162 lb 12.8 oz)

## 2024-04-18 NOTE — PROGRESS NOTES
Chief Complaint   Patient presents with    Facial Swelling    Epistaxis     BP (!) 151/82   Pulse 67   Temp 97.8 °F (36.6 °C)   Resp 16   Ht 1.575 m (5' 2\")   Wt 69.9 kg (154 lb)   SpO2 100%   BMI 28.17 kg/m²   1. Have you been to the ER, urgent care clinic since your last visit?  Hospitalized since your last visit?No    2. Have you seen or consulted any other health care providers outside of the [facility] since your last visit?  Include any pap smears or colon screening. No

## 2024-07-02 ENCOUNTER — OFFICE VISIT (OUTPATIENT)
Facility: CLINIC | Age: 59
End: 2024-07-02
Payer: COMMERCIAL

## 2024-07-02 VITALS
BODY MASS INDEX: 28.19 KG/M2 | OXYGEN SATURATION: 98 % | TEMPERATURE: 98.2 F | WEIGHT: 153.2 LBS | RESPIRATION RATE: 16 BRPM | DIASTOLIC BLOOD PRESSURE: 87 MMHG | HEART RATE: 96 BPM | SYSTOLIC BLOOD PRESSURE: 130 MMHG | HEIGHT: 62 IN

## 2024-07-02 DIAGNOSIS — I11.9 MALIGNANT HYPERTENSIVE HEART DISEASE WITHOUT HEART FAILURE: Primary | ICD-10-CM

## 2024-07-02 DIAGNOSIS — G44.209 TENSION HEADACHE: ICD-10-CM

## 2024-07-02 PROCEDURE — 3079F DIAST BP 80-89 MM HG: CPT | Performed by: FAMILY MEDICINE

## 2024-07-02 PROCEDURE — 99213 OFFICE O/P EST LOW 20 MIN: CPT | Performed by: FAMILY MEDICINE

## 2024-07-02 PROCEDURE — 3075F SYST BP GE 130 - 139MM HG: CPT | Performed by: FAMILY MEDICINE

## 2024-07-02 RX ORDER — SUMATRIPTAN 50 MG/1
50 TABLET, FILM COATED ORAL 2 TIMES DAILY PRN
Qty: 9 TABLET | Refills: 0 | Status: SHIPPED | OUTPATIENT
Start: 2024-07-02

## 2024-07-02 ASSESSMENT — PATIENT HEALTH QUESTIONNAIRE - PHQ9
SUM OF ALL RESPONSES TO PHQ QUESTIONS 1-9: 0
1. LITTLE INTEREST OR PLEASURE IN DOING THINGS: NOT AT ALL
2. FEELING DOWN, DEPRESSED OR HOPELESS: NOT AT ALL
SUM OF ALL RESPONSES TO PHQ9 QUESTIONS 1 & 2: 0
SUM OF ALL RESPONSES TO PHQ QUESTIONS 1-9: 0

## 2024-07-02 NOTE — PROGRESS NOTES
Chief Complaint   Patient presents with    sick visit    Headache     Pt reports having a headache x 3 days     /87 (Site: Left Upper Arm, Position: Sitting, Cuff Size: Large Adult)   Pulse 96   Temp 98.2 °F (36.8 °C) (Oral)   Resp 16   Ht 1.575 m (5' 2\")   Wt 69.5 kg (153 lb 3.2 oz)   SpO2 98%   BMI 28.02 kg/m²     \"Have you been to the ER, urgent care clinic since your last visit?  Hospitalized since your last visit?\"    NO    “Have you seen or consulted any other health care providers outside of [facility] since your last visit?”    NO            Click Here for Release of Records Request

## 2024-07-02 NOTE — PROGRESS NOTES
Emir Liang is a 58 y.o. female and presents with sick visit and Headache (Pt reports having a headache x 3 days)  .  Headache   with a hx of HTN and GERD and Hyperlipidemia  58 y.o. Patient here on acute visit c/o headaches 7/10 intensity localized to the rt. Frontal area with no  current fever,nausea and vomiting or abdominal pain. Headaches are not  associated with photo or phonophobia.  Pt admits to recent stress taking care of an ailing brother and work related stress    Subjective:  Cardiovascular Review:  The patient has hypertension   Diet and Lifestyle: generally follows a low fat low cholesterol diet, generally follows a low sodium diet, exercises sporadically  Home BP Monitoring: is not measured at home.  Pertinent ROS: taking medications as instructed, no medication side effects noted, no TIA's, no chest pain on exertion, no dyspnea on exertion, no swelling of ankles.     Review of Systems  Review of Systems - Neurological ROS: positive for - headaches       Past Medical History:   Diagnosis Date    Allergies     Back pain     GERD (gastroesophageal reflux disease)     Headache     History of throat problem     Hypercholesterolemia     Hypertension     Sinus problem      Past Surgical History:   Procedure Laterality Date    COLONOSCOPY N/A 2/20/2024    COLONOSCOPY performed by Lucía De La Rosa MD at [facility] ENDOSCOPY    COLONOSCOPY N/A 2/20/2024    COLONOSCOPY WITH BIOPSY performed by Lucía De La Rosa MD at [facility] ENDOSCOPY    GYN      HYSTERECTOMY (CERVIX STATUS UNKNOWN)      ORTHOPEDIC SURGERY Left 2001    knee    PARTIAL HYSTERECTOMY (CERVIX NOT REMOVED)      TUBAL LIGATION  1993     Social History     Socioeconomic History    Marital status:      Spouse name: None    Number of children: None    Years of education: None    Highest education level: None   Tobacco Use    Smoking status: Never    Smokeless tobacco: Never   Substance and Sexual Activity    Alcohol use: Yes    Drug

## 2024-08-12 RX ORDER — PRAVASTATIN SODIUM 40 MG
TABLET ORAL
Qty: 90 TABLET | Refills: 0 | Status: SHIPPED | OUTPATIENT
Start: 2024-08-12

## 2024-09-23 RX ORDER — AMLODIPINE BESYLATE 5 MG/1
5 TABLET ORAL DAILY
Qty: 30 TABLET | Refills: 2 | OUTPATIENT
Start: 2024-09-23

## 2024-10-01 ENCOUNTER — OFFICE VISIT (OUTPATIENT)
Facility: CLINIC | Age: 59
End: 2024-10-01
Payer: COMMERCIAL

## 2024-10-01 VITALS
RESPIRATION RATE: 16 BRPM | DIASTOLIC BLOOD PRESSURE: 85 MMHG | WEIGHT: 161.2 LBS | TEMPERATURE: 98.1 F | HEIGHT: 62 IN | BODY MASS INDEX: 29.66 KG/M2 | HEART RATE: 75 BPM | SYSTOLIC BLOOD PRESSURE: 129 MMHG | OXYGEN SATURATION: 97 %

## 2024-10-01 DIAGNOSIS — S99.922A INJURY OF LEFT FOOT, INITIAL ENCOUNTER: ICD-10-CM

## 2024-10-01 DIAGNOSIS — E78.2 MIXED HYPERLIPIDEMIA: ICD-10-CM

## 2024-10-01 DIAGNOSIS — Z28.21 REFUSED INFLUENZA VACCINE: ICD-10-CM

## 2024-10-01 DIAGNOSIS — K21.00 GASTROESOPHAGEAL REFLUX DISEASE WITH ESOPHAGITIS WITHOUT HEMORRHAGE: ICD-10-CM

## 2024-10-01 DIAGNOSIS — I11.9 MALIGNANT HYPERTENSIVE HEART DISEASE WITHOUT HEART FAILURE: Primary | ICD-10-CM

## 2024-10-01 DIAGNOSIS — S90.852A ACUTE FOREIGN BODY OF LEFT FOOT, INITIAL ENCOUNTER: ICD-10-CM

## 2024-10-01 PROCEDURE — 3074F SYST BP LT 130 MM HG: CPT | Performed by: FAMILY MEDICINE

## 2024-10-01 PROCEDURE — 99214 OFFICE O/P EST MOD 30 MIN: CPT | Performed by: FAMILY MEDICINE

## 2024-10-01 PROCEDURE — 3079F DIAST BP 80-89 MM HG: CPT | Performed by: FAMILY MEDICINE

## 2024-10-01 RX ORDER — SUMATRIPTAN 50 MG/1
50 TABLET, FILM COATED ORAL 2 TIMES DAILY PRN
Qty: 9 TABLET | Refills: 0 | Status: SHIPPED | OUTPATIENT
Start: 2024-10-01

## 2024-10-01 RX ORDER — AMLODIPINE BESYLATE 5 MG/1
5 TABLET ORAL DAILY
Qty: 90 TABLET | Refills: 0 | Status: SHIPPED | OUTPATIENT
Start: 2024-10-01

## 2024-10-01 NOTE — PROGRESS NOTES
Chief Complaint   Patient presents with    3 Month Follow-Up    Hypertension     Pt here for her 3 month follow up visit for hypertension and reports having a sore knot on the bottom of her left foot     /85 (Site: Right Upper Arm, Position: Sitting, Cuff Size: Medium Adult)   Pulse 75   Temp 98.1 °F (36.7 °C) (Oral)   Resp 16   Ht 1.575 m (5' 2\")   Wt 73.1 kg (161 lb 3.2 oz)   SpO2 97%   BMI 29.48 kg/m²     \"Have you been to the ER, urgent care clinic since your last visit?  Hospitalized since your last visit?\"    NO    “Have you seen or consulted any other health care providers outside our system since your last visit?”    NO           
 (12/19/2023)    Overall Financial Resource Strain (CARDIA)     Difficulty of Paying Living Expenses: Not hard at all   Food Insecurity: No Food Insecurity (12/19/2023)    Hunger Vital Sign     Worried About Running Out of Food in the Last Year: Never true     Ran Out of Food in the Last Year: Never true   Transportation Needs: Unknown (12/19/2023)    PRAPARE - Transportation     Lack of Transportation (Non-Medical): No   Housing Stability: Unknown (12/19/2023)    Housing Stability Vital Sign     Unstable Housing in the Last Year: No     Family History   Problem Relation Age of Onset    Cancer Mother     Hypertension Father     Ovarian Cancer Maternal Aunt      Current Outpatient Medications   Medication Sig Dispense Refill    pravastatin (PRAVACHOL) 40 MG tablet TAKE 1 TABLET BY MOUTH EVERY NIGHT FOR HIGH CHOLESTEROL 90 tablet 0    SUMAtriptan (IMITREX) 50 MG tablet Take 1 tablet by mouth 2 times daily as needed for Migraine 9 tablet 0    diclofenac (VOLTAREN) 50 MG EC tablet Take 1 tablet by mouth 2 times daily as needed for Pain 20 tablet 0    amLODIPine (NORVASC) 5 MG tablet TAKE 1 TABLET BY MOUTH DAILY 30 tablet 2    acetaminophen (TYLENOL) 500 MG tablet Take by mouth every 6 hours as needed       No current facility-administered medications for this visit.     Allergies   Allergen Reactions    Cat Hair Extract Other (See Comments)     Not allergic     Dog Epithelium (Canis Lupus Familiaris) Other (See Comments)     Not allergic     Oxycodone-Acetaminophen Itching    Penicillins Other (See Comments)       Objective:  /85 (Site: Right Upper Arm, Position: Sitting, Cuff Size: Medium Adult)   Pulse 75   Temp 98.1 °F (36.7 °C) (Oral)   Resp 16   Ht 1.575 m (5' 2\")   Wt 73.1 kg (161 lb 3.2 oz)   SpO2 97%   BMI 29.48 kg/m²     Physical Exam:   Physical Exam  Vitals and nursing note reviewed.   Constitutional:       Appearance: Normal appearance.   HENT:      Head: Normocephalic and atraumatic.      Right

## 2024-11-20 RX ORDER — PRAVASTATIN SODIUM 40 MG
TABLET ORAL
Qty: 90 TABLET | Refills: 0 | Status: SHIPPED | OUTPATIENT
Start: 2024-11-20

## 2024-12-20 ENCOUNTER — HOSPITAL ENCOUNTER (OUTPATIENT)
Facility: HOSPITAL | Age: 59
Setting detail: RECURRING SERIES
Discharge: HOME | End: 2024-12-23
Payer: COMMERCIAL

## 2024-12-20 PROCEDURE — 97110 THERAPEUTIC EXERCISES: CPT | Performed by: PHYSICAL THERAPIST

## 2024-12-20 PROCEDURE — 97161 PT EVAL LOW COMPLEX 20 MIN: CPT | Performed by: PHYSICAL THERAPIST

## 2024-12-20 PROCEDURE — 97035 APP MDLTY 1+ULTRASOUND EA 15: CPT | Performed by: PHYSICAL THERAPIST

## 2024-12-20 PROCEDURE — 97140 MANUAL THERAPY 1/> REGIONS: CPT | Performed by: PHYSICAL THERAPIST

## 2024-12-20 NOTE — THERAPY EVALUATION
Sammy Carreno [facility]  [address], [address]  [place] [address]  Phone: [phone]    Fax: [phone]         PHYSICAL THERAPY - MEDICARE EVALUATION/PLAN OF CARE NOTE (updated 3/23)      Date: 2024          Patient Name:  Emir Liang :  1965   Medical   Diagnosis:  Bilateral foot pain [M79.671, M79.672] Treatment Diagnosis:  M79.672  LEFT FOOT PAIN    Referral Source:  Sam Aviles DPM Provider #:  [id number]                [place] / Product Type: *No Product type* /      Patient  verified yes     Visit #   Current  / Total 1 POC exp    Time   In / Out 1010 am  1100 am    Total Treatment Time 50 minutes.    Total Timed Codes 25   1:1 Treatment Time 25      [facility] Totals Reminder:  bill using total billable   min of TIMED therapeutic procedures and modalities.   8-22 min = 1 unit; 23-37 min = 2 units; 38-52 min = 3 units;  53-67 min = 4 units; 68-82 min = 5 units           SUBJECTIVE  Pain Level (0-10 scale): 0, 3 or 4/10  []constant [x]intermittent []improving []worsening []no change since onset    Any medication changes, allergies to medications, adverse drug reactions, diagnosis change, or new procedure performed?: [x] No    [] Yes (see summary sheet for update)  Medications: Verified on Patient Summary List    Subjective functional status/changes:     Chief complaint of dull ache bottom of left foot close to the arch.  Denies numbness or tingling.  Sleep is not disturbed.   Steriod injection 2 months with questionable benefit.  Night splint that patient wears part of the night.  Overall, has not helped.  Notably:  no am discomfort when getting out of bed.     Aggravating factors:  time on feet  Relieving factors:  arch support, massage    Start of Care: 2024  Onset Date: 4 months ago without cause.   Current symptoms/Complaints: see above.   Mechanism of Injury: none.   PLOF: no issues.   Limitations

## 2024-12-27 ENCOUNTER — HOSPITAL ENCOUNTER (OUTPATIENT)
Facility: HOSPITAL | Age: 59
Setting detail: RECURRING SERIES
End: 2024-12-27
Payer: COMMERCIAL

## 2024-12-30 ENCOUNTER — OFFICE VISIT (OUTPATIENT)
Facility: CLINIC | Age: 59
End: 2024-12-30
Payer: COMMERCIAL

## 2024-12-30 VITALS
HEIGHT: 62 IN | RESPIRATION RATE: 16 BRPM | HEART RATE: 75 BPM | DIASTOLIC BLOOD PRESSURE: 86 MMHG | TEMPERATURE: 98.3 F | OXYGEN SATURATION: 95 % | BODY MASS INDEX: 27.97 KG/M2 | WEIGHT: 152 LBS | SYSTOLIC BLOOD PRESSURE: 145 MMHG

## 2024-12-30 DIAGNOSIS — K21.00 GASTROESOPHAGEAL REFLUX DISEASE WITH ESOPHAGITIS WITHOUT HEMORRHAGE: ICD-10-CM

## 2024-12-30 DIAGNOSIS — K57.92 DIVERTICULITIS: ICD-10-CM

## 2024-12-30 DIAGNOSIS — I11.9 MALIGNANT HYPERTENSIVE HEART DISEASE WITHOUT HEART FAILURE: Primary | ICD-10-CM

## 2024-12-30 PROCEDURE — 99215 OFFICE O/P EST HI 40 MIN: CPT | Performed by: FAMILY MEDICINE

## 2024-12-30 PROCEDURE — 3077F SYST BP >= 140 MM HG: CPT | Performed by: FAMILY MEDICINE

## 2024-12-30 PROCEDURE — 3079F DIAST BP 80-89 MM HG: CPT | Performed by: FAMILY MEDICINE

## 2024-12-30 RX ORDER — KETOROLAC TROMETHAMINE 10 MG/1
10 TABLET, FILM COATED ORAL
COMMUNITY
Start: 2024-12-24

## 2024-12-30 NOTE — PROGRESS NOTES
Chief Complaint   Patient presents with    Follow-up     Pt here for a ER follow up visit on 12/24 for diverticulitis     BP (!) 145/86 (Site: Left Upper Arm, Position: Sitting, Cuff Size: Medium Adult)   Pulse 75   Temp 98.3 °F (36.8 °C) (Oral)   Resp 16   Ht 1.575 m (5' 2\")   Wt 68.9 kg (152 lb)   SpO2 95%   BMI 27.80 kg/m²         \"Have you been to the ER, urgent care clinic since your last visit?  Hospitalized since your last visit?\"    YES - When: approximately 7 days ago.  Where and Why: [facility] on 12/24 for diverticulitis .    “Have you seen or consulted any other health care providers outside our system since your last visit?”    NO           
Low Risk  (12/19/2023)    Overall Financial Resource Strain (CARDIA)     Difficulty of Paying Living Expenses: Not hard at all   Food Insecurity: No Food Insecurity (12/19/2023)    Hunger Vital Sign     Worried About Running Out of Food in the Last Year: Never true     Ran Out of Food in the Last Year: Never true   Transportation Needs: Unknown (12/19/2023)    PRAPARE - Transportation     Lack of Transportation (Non-Medical): No   Housing Stability: Unknown (12/19/2023)    Housing Stability Vital Sign     Unstable Housing in the Last Year: No     Family History   Problem Relation Age of Onset    Cancer Mother     Hypertension Father     Ovarian Cancer Maternal Aunt      Current Outpatient Medications   Medication Sig Dispense Refill    amoxicillin-clavulanate (AUGMENTIN) 875-125 MG per tablet 1 tablet      ketorolac (TORADOL) 10 MG tablet 1 tablet      pravastatin (PRAVACHOL) 40 MG tablet TAKE 1 TABLET BY MOUTH EVERY NIGHT FOR HIGH CHOLESTEROL 90 tablet 0    amLODIPine (NORVASC) 5 MG tablet Take 1 tablet by mouth daily 90 tablet 0    SUMAtriptan (IMITREX) 50 MG tablet Take 1 tablet by mouth 2 times daily as needed for Migraine 9 tablet 0    acetaminophen (TYLENOL) 500 MG tablet Take by mouth every 6 hours as needed      diclofenac (VOLTAREN) 50 MG EC tablet Take 1 tablet by mouth 2 times daily as needed for Pain (Patient not taking: Reported on 12/30/2024) 20 tablet 0     No current facility-administered medications for this visit.     Allergies   Allergen Reactions    Oxycodone-Acetaminophen Itching    Penicillins Other (See Comments)       Objective:  BP (!) 145/86 (Site: Left Upper Arm, Position: Sitting, Cuff Size: Medium Adult)   Pulse 75   Temp 98.3 °F (36.8 °C) (Oral)   Resp 16   Ht 1.575 m (5' 2\")   Wt 68.9 kg (152 lb)   SpO2 95%   BMI 27.80 kg/m²     Physical Exam:   Physical Exam  Vitals and nursing note reviewed.   Constitutional:       Appearance: Normal appearance.   HENT:      Head:

## 2025-01-20 RX ORDER — AMLODIPINE BESYLATE 5 MG/1
5 TABLET ORAL DAILY
Qty: 90 TABLET | Refills: 0 | Status: SHIPPED | OUTPATIENT
Start: 2025-01-20

## 2025-02-19 RX ORDER — PRAVASTATIN SODIUM 40 MG
TABLET ORAL
Qty: 90 TABLET | Refills: 0 | Status: SHIPPED | OUTPATIENT
Start: 2025-02-19

## 2025-02-20 LAB
ALBUMIN SERPL-MCNC: 4.3 G/DL (ref 3.8–4.9)
ALP SERPL-CCNC: 107 IU/L (ref 44–121)
ALT SERPL-CCNC: 19 IU/L (ref 0–32)
AST SERPL-CCNC: 21 IU/L (ref 0–40)
BASOPHILS # BLD AUTO: 0 X10E3/UL (ref 0–0.2)
BASOPHILS NFR BLD AUTO: 0 %
BILIRUB SERPL-MCNC: 0.5 MG/DL (ref 0–1.2)
BUN SERPL-MCNC: 13 MG/DL (ref 6–24)
BUN/CREAT SERPL: 18 (ref 9–23)
CALCIUM SERPL-MCNC: 9.7 MG/DL (ref 8.7–10.2)
CHLORIDE SERPL-SCNC: 105 MMOL/L (ref 96–106)
CHOLEST SERPL-MCNC: 230 MG/DL (ref 100–199)
CO2 SERPL-SCNC: 25 MMOL/L (ref 20–29)
CREAT SERPL-MCNC: 0.73 MG/DL (ref 0.57–1)
EGFRCR SERPLBLD CKD-EPI 2021: 95 ML/MIN/1.73
EOSINOPHIL # BLD AUTO: 0.1 X10E3/UL (ref 0–0.4)
EOSINOPHIL NFR BLD AUTO: 2 %
ERYTHROCYTE [DISTWIDTH] IN BLOOD BY AUTOMATED COUNT: 13.3 % (ref 11.7–15.4)
GLOBULIN SER CALC-MCNC: 2.9 G/DL (ref 1.5–4.5)
GLUCOSE SERPL-MCNC: 88 MG/DL (ref 70–99)
HCT VFR BLD AUTO: 41.5 % (ref 34–46.6)
HDLC SERPL-MCNC: 54 MG/DL
HGB BLD-MCNC: 13 G/DL (ref 11.1–15.9)
IMM GRANULOCYTES # BLD AUTO: 0 X10E3/UL (ref 0–0.1)
IMM GRANULOCYTES NFR BLD AUTO: 0 %
LDLC SERPL CALC-MCNC: 140 MG/DL (ref 0–99)
LYMPHOCYTES # BLD AUTO: 2.3 X10E3/UL (ref 0.7–3.1)
LYMPHOCYTES NFR BLD AUTO: 47 %
MCH RBC QN AUTO: 25.9 PG (ref 26.6–33)
MCHC RBC AUTO-ENTMCNC: 31.3 G/DL (ref 31.5–35.7)
MCV RBC AUTO: 83 FL (ref 79–97)
MONOCYTES # BLD AUTO: 0.4 X10E3/UL (ref 0.1–0.9)
MONOCYTES NFR BLD AUTO: 7 %
NEUTROPHILS # BLD AUTO: 2.1 X10E3/UL (ref 1.4–7)
NEUTROPHILS NFR BLD AUTO: 44 %
PLATELET # BLD AUTO: 318 X10E3/UL (ref 150–450)
POTASSIUM SERPL-SCNC: 4.5 MMOL/L (ref 3.5–5.2)
PROT SERPL-MCNC: 7.2 G/DL (ref 6–8.5)
RBC # BLD AUTO: 5.02 X10E6/UL (ref 3.77–5.28)
SODIUM SERPL-SCNC: 141 MMOL/L (ref 134–144)
TRIGL SERPL-MCNC: 201 MG/DL (ref 0–149)
VLDLC SERPL CALC-MCNC: 36 MG/DL (ref 5–40)
WBC # BLD AUTO: 4.9 X10E3/UL (ref 3.4–10.8)

## 2025-02-27 ENCOUNTER — OFFICE VISIT (OUTPATIENT)
Facility: CLINIC | Age: 60
End: 2025-02-27
Payer: COMMERCIAL

## 2025-02-27 VITALS
OXYGEN SATURATION: 98 % | BODY MASS INDEX: 28.93 KG/M2 | HEIGHT: 62 IN | TEMPERATURE: 98.4 F | SYSTOLIC BLOOD PRESSURE: 137 MMHG | WEIGHT: 157.2 LBS | DIASTOLIC BLOOD PRESSURE: 74 MMHG | RESPIRATION RATE: 16 BRPM | HEART RATE: 90 BPM

## 2025-02-27 DIAGNOSIS — K21.00 GASTROESOPHAGEAL REFLUX DISEASE WITH ESOPHAGITIS WITHOUT HEMORRHAGE: ICD-10-CM

## 2025-02-27 DIAGNOSIS — Z87.19 HISTORY OF DIVERTICULAR ABSCESS OF COLON: ICD-10-CM

## 2025-02-27 DIAGNOSIS — E78.3 MIXED HYPERGLYCERIDEMIA: ICD-10-CM

## 2025-02-27 DIAGNOSIS — R10.13 EPIGASTRIC PAIN: ICD-10-CM

## 2025-02-27 DIAGNOSIS — I11.9 MALIGNANT HYPERTENSIVE HEART DISEASE WITHOUT HEART FAILURE: ICD-10-CM

## 2025-02-27 DIAGNOSIS — M15.3 OTHER SECONDARY OSTEOARTHRITIS OF MULTIPLE SITES: ICD-10-CM

## 2025-02-27 DIAGNOSIS — E78.2 MIXED HYPERLIPIDEMIA: Primary | ICD-10-CM

## 2025-02-27 PROCEDURE — 3075F SYST BP GE 130 - 139MM HG: CPT | Performed by: FAMILY MEDICINE

## 2025-02-27 PROCEDURE — 3078F DIAST BP <80 MM HG: CPT | Performed by: FAMILY MEDICINE

## 2025-02-27 PROCEDURE — 99214 OFFICE O/P EST MOD 30 MIN: CPT | Performed by: FAMILY MEDICINE

## 2025-02-27 SDOH — ECONOMIC STABILITY: FOOD INSECURITY: WITHIN THE PAST 12 MONTHS, YOU WORRIED THAT YOUR FOOD WOULD RUN OUT BEFORE YOU GOT MONEY TO BUY MORE.: NEVER TRUE

## 2025-02-27 SDOH — ECONOMIC STABILITY: FOOD INSECURITY: WITHIN THE PAST 12 MONTHS, THE FOOD YOU BOUGHT JUST DIDN'T LAST AND YOU DIDN'T HAVE MONEY TO GET MORE.: NEVER TRUE

## 2025-02-27 ASSESSMENT — PATIENT HEALTH QUESTIONNAIRE - PHQ9
SUM OF ALL RESPONSES TO PHQ QUESTIONS 1-9: 0
1. LITTLE INTEREST OR PLEASURE IN DOING THINGS: NOT AT ALL
2. FEELING DOWN, DEPRESSED OR HOPELESS: NOT AT ALL
SUM OF ALL RESPONSES TO PHQ QUESTIONS 1-9: 0
SUM OF ALL RESPONSES TO PHQ9 QUESTIONS 1 & 2: 0
SUM OF ALL RESPONSES TO PHQ QUESTIONS 1-9: 0
SUM OF ALL RESPONSES TO PHQ QUESTIONS 1-9: 0

## 2025-02-27 NOTE — PROGRESS NOTES
Emir Liang is a 59 y.o. female and presents with 3 Month Follow-Up and Hypertension (Pt here for her 3 month follow up for HTN)  .  Hypertension     with a hx of HTN and Hyperlipidemia  59 y.o. Patient here on a routine follow up with no recent symptoms except intermittent abdominal pain in pt with a history of diverticulitis 2 months ago stating she eats infrequently due to this hx    Subjective:  Cardiovascular Review:  The patient has hypertension   Diet and Lifestyle: generally follows a low fat low cholesterol diet, generally follows a low sodium diet, exercises sporadically  Home BP Monitoring: is not measured at home.  Pertinent ROS: taking medications as instructed, no medication side effects noted, no TIA's, no chest pain on exertion, no dyspnea on exertion, no swelling of ankles.     Review of Systems  Review of Systems - Gastrointestinal ROS: positive for - abdominal pain       Past Medical History:   Diagnosis Date    Allergies     Back pain     GERD (gastroesophageal reflux disease)     Headache     History of throat problem     Hypercholesterolemia     Hypertension     Sinus problem      Past Surgical History:   Procedure Laterality Date    CARDIAC SURGERY      COLONOSCOPY N/A 02/20/2024    COLONOSCOPY performed by Lucía De La Rosa MD at [facility] ENDOSCOPY    COLONOSCOPY N/A 02/20/2024    COLONOSCOPY WITH BIOPSY performed by Lucía De La Rosa MD at [facility] ENDOSCOPY    GYN      HYSTERECTOMY (CERVIX STATUS UNKNOWN)      ORTHOPEDIC SURGERY Left 2001    knee    PARTIAL HYSTERECTOMY (CERVIX NOT REMOVED)      TUBAL LIGATION  1993     Social History     Socioeconomic History    Marital status:      Spouse name: None    Number of children: None    Years of education: None    Highest education level: None   Tobacco Use    Smoking status: Never    Smokeless tobacco: Never   Substance and Sexual Activity    Alcohol use: Yes    Drug use: Yes     Types: Marijuana (Weed)     Social

## 2025-02-27 NOTE — PROGRESS NOTES
Chief Complaint   Patient presents with    3 Month Follow-Up    Hypertension     Pt here for her 3 month follow up for HTN     /74 (Site: Left Upper Arm, Position: Sitting, Cuff Size: Medium Adult)   Pulse 90   Temp 98.4 °F (36.9 °C) (Oral)   Resp 16   Ht 1.575 m (5' 2\")   Wt 71.3 kg (157 lb 3.2 oz)   SpO2 98%   BMI 28.75 kg/m²       \"Have you been to the ER, urgent care clinic since your last visit?  Hospitalized since your last visit?\"    NO    “Have you seen or consulted any other health care providers outside our system since your last visit?”    NO

## 2025-04-18 RX ORDER — AMLODIPINE BESYLATE 5 MG/1
5 TABLET ORAL DAILY
Qty: 90 TABLET | Refills: 0 | Status: SHIPPED | OUTPATIENT
Start: 2025-04-18

## 2025-05-29 ENCOUNTER — OFFICE VISIT (OUTPATIENT)
Facility: CLINIC | Age: 60
End: 2025-05-29
Payer: COMMERCIAL

## 2025-05-29 VITALS
OXYGEN SATURATION: 98 % | DIASTOLIC BLOOD PRESSURE: 85 MMHG | HEART RATE: 98 BPM | RESPIRATION RATE: 16 BRPM | HEIGHT: 62 IN | SYSTOLIC BLOOD PRESSURE: 137 MMHG | BODY MASS INDEX: 27.57 KG/M2 | WEIGHT: 149.8 LBS | TEMPERATURE: 98.4 F

## 2025-05-29 DIAGNOSIS — N95.1 MENOPAUSAL FLUSHING: ICD-10-CM

## 2025-05-29 DIAGNOSIS — I11.9 MALIGNANT HYPERTENSIVE HEART DISEASE WITHOUT HEART FAILURE: ICD-10-CM

## 2025-05-29 DIAGNOSIS — E78.3 MIXED HYPERGLYCERIDEMIA: ICD-10-CM

## 2025-05-29 DIAGNOSIS — L23.5 ALLERGIC DERMATITIS DUE TO OTHER CHEMICAL PRODUCT: Primary | ICD-10-CM

## 2025-05-29 DIAGNOSIS — K21.00 GASTROESOPHAGEAL REFLUX DISEASE WITH ESOPHAGITIS WITHOUT HEMORRHAGE: ICD-10-CM

## 2025-05-29 PROBLEM — L25.9 DERMATITIS VENENATA: Status: ACTIVE | Noted: 2025-05-29

## 2025-05-29 PROCEDURE — 3075F SYST BP GE 130 - 139MM HG: CPT | Performed by: FAMILY MEDICINE

## 2025-05-29 PROCEDURE — 99214 OFFICE O/P EST MOD 30 MIN: CPT | Performed by: FAMILY MEDICINE

## 2025-05-29 PROCEDURE — 3079F DIAST BP 80-89 MM HG: CPT | Performed by: FAMILY MEDICINE

## 2025-05-29 RX ORDER — METHYLPREDNISOLONE 4 MG/1
TABLET ORAL
Qty: 21 TABLET | Refills: 0 | Status: SHIPPED | OUTPATIENT
Start: 2025-05-29

## 2025-05-29 RX ORDER — CLOBETASOL PROPIONATE 0.5 MG/ML
LOTION TOPICAL
Qty: 118 ML | Refills: 0 | Status: SHIPPED | OUTPATIENT
Start: 2025-05-29

## 2025-05-29 RX ORDER — PRAVASTATIN SODIUM 40 MG
TABLET ORAL
Qty: 90 TABLET | Refills: 0 | Status: SHIPPED | OUTPATIENT
Start: 2025-05-29

## 2025-05-29 RX ORDER — CLOBETASOL PROPIONATE 0.05 G/100ML
SHAMPOO TOPICAL
Qty: 50 ML | Refills: 1 | Status: SHIPPED | OUTPATIENT
Start: 2025-05-29

## 2025-05-29 NOTE — PROGRESS NOTES
Emir Liang is a 59 y.o. female and presents with 3 Month Follow-Up, Hypertension, and Hyperlipidemia (Pt here for his 3 month follow up visit for hypertension, pt has itching hives/rash on both legs, arms, trunk, and back x 1 week)  .  HPI with a hx of HTN and Hyperlipidemia  59 y.o. Patient here routine follow up and also  on acute care visit with c/o of a pruritic rash x 6 days that started soon after using a new body wash    Subjective:  Cardiovascular Review:  The patient has hypertension   Diet and Lifestyle: generally follows a low fat low cholesterol diet, generally follows a low sodium diet, exercises sporadically  Home BP Monitoring: is not measured at home.  Pertinent ROS: taking medications as instructed, no medication side effects noted, no TIA's, no chest pain on exertion, no dyspnea on exertion, no swelling of ankles.     Review of Systems  Review of Systems - Dermatological ROS: positive for - pruritus, rash, and skin lesion changes       Past Medical History:   Diagnosis Date    Allergies     Back pain     GERD (gastroesophageal reflux disease)     Headache     History of throat problem     Hypercholesterolemia     Hypertension     Sinus problem      Past Surgical History:   Procedure Laterality Date    CARDIAC SURGERY      COLONOSCOPY N/A 02/20/2024    COLONOSCOPY performed by Lucía De La Rosa MD at [facility] ENDOSCOPY    COLONOSCOPY N/A 02/20/2024    COLONOSCOPY WITH BIOPSY performed by Lucía De La Rosa MD at [facility] ENDOSCOPY    GYN      HYSTERECTOMY (CERVIX STATUS UNKNOWN)      ORTHOPEDIC SURGERY Left 2001    knee    PARTIAL HYSTERECTOMY (CERVIX NOT REMOVED)      TUBAL LIGATION  1993     Social History     Socioeconomic History    Marital status:      Spouse name: None    Number of children: None    Years of education: None    Highest education level: None   Tobacco Use    Smoking status: Never    Smokeless tobacco: Never   Substance and Sexual Activity    Alcohol use: Yes

## 2025-05-29 NOTE — PROGRESS NOTES
Chief Complaint   Patient presents with    3 Month Follow-Up    Hypertension    Hyperlipidemia     Pt here for his 3 month follow up visit for hypertension, pt has itching hives/rash on both legs, arms, trunk, and back x 1 week     /85 (BP Site: Left Upper Arm, Patient Position: Sitting, BP Cuff Size: Medium Adult)   Pulse 98   Temp 98.4 °F (36.9 °C) (Oral)   Resp 16   Ht 1.575 m (5' 2\")   Wt 67.9 kg (149 lb 12.8 oz)   SpO2 98%   BMI 27.40 kg/m²         Have you been to the ER, urgent care clinic since your last visit?  Hospitalized since your last visit?   NO    Have you seen or consulted any other health care providers outside our system since your last visit?   NO

## 2025-08-08 ENCOUNTER — APPOINTMENT (OUTPATIENT)
Facility: HOSPITAL | Age: 60
End: 2025-08-08
Payer: COMMERCIAL

## 2025-08-08 ENCOUNTER — HOSPITAL ENCOUNTER (EMERGENCY)
Facility: HOSPITAL | Age: 60
Discharge: HOME | End: 2025-08-08
Payer: COMMERCIAL

## 2025-08-08 VITALS
OXYGEN SATURATION: 96 % | HEIGHT: 62 IN | DIASTOLIC BLOOD PRESSURE: 87 MMHG | TEMPERATURE: 98.3 F | RESPIRATION RATE: 16 BRPM | HEART RATE: 85 BPM | BODY MASS INDEX: 27.23 KG/M2 | WEIGHT: 148 LBS | SYSTOLIC BLOOD PRESSURE: 132 MMHG

## 2025-08-08 DIAGNOSIS — K57.32 DIVERTICULITIS OF COLON: Primary | ICD-10-CM

## 2025-08-08 LAB
ALBUMIN SERPL-MCNC: 3.6 G/DL (ref 3.5–5)
ALBUMIN/GLOB SERPL: 0.9 (ref 1.1–2.2)
ALP SERPL-CCNC: 126 U/L (ref 45–117)
ALT SERPL-CCNC: 52 U/L (ref 12–78)
ANION GAP SERPL CALC-SCNC: 7 MMOL/L (ref 2–12)
APPEARANCE UR: CLEAR
AST SERPL W P-5'-P-CCNC: 74 U/L (ref 15–37)
BACTERIA URNS QL MICRO: NEGATIVE /HPF
BASOPHILS # BLD: 0.03 K/UL (ref 0–0.1)
BASOPHILS NFR BLD: 0.3 % (ref 0–1)
BILIRUB SERPL-MCNC: 0.6 MG/DL (ref 0.2–1)
BILIRUB UR QL: NEGATIVE
BUN SERPL-MCNC: 16 MG/DL (ref 6–20)
BUN/CREAT SERPL: 14 (ref 12–20)
CA-I BLD-MCNC: 9.2 MG/DL (ref 8.5–10.1)
CHLORIDE SERPL-SCNC: 110 MMOL/L (ref 97–108)
CO2 SERPL-SCNC: 25 MMOL/L (ref 21–32)
COLOR UR: ABNORMAL
CREAT SERPL-MCNC: 1.11 MG/DL (ref 0.55–1.02)
D DIMER PPP FEU-MCNC: 0.52 UG/ML(FEU)
DIFFERENTIAL METHOD BLD: ABNORMAL
EOSINOPHIL # BLD: 0.06 K/UL (ref 0–0.4)
EOSINOPHIL NFR BLD: 0.6 % (ref 0–7)
EPITH CASTS URNS QL MICRO: ABNORMAL /LPF
ERYTHROCYTE [DISTWIDTH] IN BLOOD BY AUTOMATED COUNT: 13.1 % (ref 11.5–14.5)
GLOBULIN SER CALC-MCNC: 3.8 G/DL (ref 2–4)
GLUCOSE SERPL-MCNC: 98 MG/DL (ref 65–100)
GLUCOSE UR STRIP.AUTO-MCNC: NEGATIVE MG/DL
HCT VFR BLD AUTO: 40.9 % (ref 35–47)
HGB BLD-MCNC: 13 G/DL (ref 11.5–16)
HGB UR QL STRIP: ABNORMAL
IMM GRANULOCYTES # BLD AUTO: 0.04 K/UL (ref 0–0.04)
IMM GRANULOCYTES NFR BLD AUTO: 0.4 % (ref 0–0.5)
KETONES UR QL STRIP.AUTO: 5 MG/DL
LEUKOCYTE ESTERASE UR QL STRIP.AUTO: NEGATIVE
LIPASE SERPL-CCNC: 16 U/L (ref 13–75)
LYMPHOCYTES # BLD: 2.4 K/UL (ref 0.8–3.5)
LYMPHOCYTES NFR BLD: 25.3 % (ref 12–49)
MAGNESIUM SERPL-MCNC: 2 MG/DL (ref 1.6–2.4)
MCH RBC QN AUTO: 25.7 PG (ref 26–34)
MCHC RBC AUTO-ENTMCNC: 31.8 G/DL (ref 30–36.5)
MCV RBC AUTO: 81 FL (ref 80–99)
MONOCYTES # BLD: 0.77 K/UL (ref 0–1)
MONOCYTES NFR BLD: 8.1 % (ref 5–13)
MUCOUS THREADS URNS QL MICRO: NEGATIVE /LPF
NEUTS SEG # BLD: 6.17 K/UL (ref 1.8–8)
NEUTS SEG NFR BLD: 65.3 % (ref 32–75)
NITRITE UR QL STRIP.AUTO: NEGATIVE
NRBC # BLD: 0 K/UL (ref 0–0.01)
NRBC BLD-RTO: 0 PER 100 WBC
PH UR STRIP: 5 (ref 5–8)
PLATELET # BLD AUTO: 300 K/UL (ref 150–400)
PMV BLD AUTO: 9.9 FL (ref 8.9–12.9)
POTASSIUM SERPL-SCNC: 4 MMOL/L (ref 3.5–5.1)
PROT SERPL-MCNC: 7.4 G/DL (ref 6.4–8.2)
PROT UR STRIP-MCNC: NEGATIVE MG/DL
RBC # BLD AUTO: 5.05 M/UL (ref 3.8–5.2)
RBC #/AREA URNS HPF: ABNORMAL /HPF (ref 0–5)
SODIUM SERPL-SCNC: 142 MMOL/L (ref 136–145)
SP GR UR REFRACTOMETRY: >1.03 (ref 1–1.03)
TROPONIN I SERPL HS-MCNC: 4 NG/L (ref 0–51)
URINE CULTURE IF INDICATED: ABNORMAL
UROBILINOGEN UR QL STRIP.AUTO: 0.1 EU/DL (ref 0.1–1)
WBC # BLD AUTO: 9.5 K/UL (ref 3.6–11)
WBC URNS QL MICRO: ABNORMAL /HPF (ref 0–4)

## 2025-08-08 PROCEDURE — 83690 ASSAY OF LIPASE: CPT

## 2025-08-08 PROCEDURE — 36415 COLL VENOUS BLD VENIPUNCTURE: CPT

## 2025-08-08 PROCEDURE — 71045 X-RAY EXAM CHEST 1 VIEW: CPT

## 2025-08-08 PROCEDURE — 96376 TX/PRO/DX INJ SAME DRUG ADON: CPT

## 2025-08-08 PROCEDURE — 81001 URINALYSIS AUTO W/SCOPE: CPT

## 2025-08-08 PROCEDURE — 6360000002 HC RX W HCPCS: Performed by: STUDENT IN AN ORGANIZED HEALTH CARE EDUCATION/TRAINING PROGRAM

## 2025-08-08 PROCEDURE — 74177 CT ABD & PELVIS W/CONTRAST: CPT

## 2025-08-08 PROCEDURE — 85025 COMPLETE CBC W/AUTO DIFF WBC: CPT

## 2025-08-08 PROCEDURE — 80053 COMPREHEN METABOLIC PANEL: CPT

## 2025-08-08 PROCEDURE — 2580000003 HC RX 258

## 2025-08-08 PROCEDURE — 85379 FIBRIN DEGRADATION QUANT: CPT

## 2025-08-08 PROCEDURE — 84484 ASSAY OF TROPONIN QUANT: CPT

## 2025-08-08 PROCEDURE — 83735 ASSAY OF MAGNESIUM: CPT

## 2025-08-08 PROCEDURE — 96375 TX/PRO/DX INJ NEW DRUG ADDON: CPT

## 2025-08-08 PROCEDURE — 6360000004 HC RX CONTRAST MEDICATION

## 2025-08-08 PROCEDURE — 99285 EMERGENCY DEPT VISIT HI MDM: CPT

## 2025-08-08 PROCEDURE — 96374 THER/PROPH/DIAG INJ IV PUSH: CPT

## 2025-08-08 PROCEDURE — 93005 ELECTROCARDIOGRAM TRACING: CPT | Performed by: EMERGENCY MEDICINE

## 2025-08-08 RX ORDER — ONDANSETRON 4 MG/1
4 TABLET, ORALLY DISINTEGRATING ORAL 3 TIMES DAILY PRN
Qty: 21 TABLET | Refills: 0 | Status: SHIPPED | OUTPATIENT
Start: 2025-08-08

## 2025-08-08 RX ORDER — IOPAMIDOL 755 MG/ML
100 INJECTION, SOLUTION INTRAVASCULAR
Status: COMPLETED | OUTPATIENT
Start: 2025-08-08 | End: 2025-08-08

## 2025-08-08 RX ORDER — MORPHINE SULFATE 4 MG/ML
4 INJECTION, SOLUTION INTRAMUSCULAR; INTRAVENOUS
Status: COMPLETED | OUTPATIENT
Start: 2025-08-08 | End: 2025-08-08

## 2025-08-08 RX ORDER — 0.9 % SODIUM CHLORIDE 0.9 %
1000 INTRAVENOUS SOLUTION INTRAVENOUS ONCE
Status: COMPLETED | OUTPATIENT
Start: 2025-08-08 | End: 2025-08-08

## 2025-08-08 RX ORDER — MORPHINE SULFATE 15 MG/1
7.5 TABLET ORAL EVERY 6 HOURS PRN
Qty: 6 TABLET | Refills: 0 | Status: SHIPPED | OUTPATIENT
Start: 2025-08-08 | End: 2025-08-11

## 2025-08-08 RX ORDER — MORPHINE SULFATE 4 MG/ML
4 INJECTION, SOLUTION INTRAMUSCULAR; INTRAVENOUS
Refills: 0 | Status: COMPLETED | OUTPATIENT
Start: 2025-08-08 | End: 2025-08-08

## 2025-08-08 RX ORDER — ONDANSETRON 2 MG/ML
4 INJECTION INTRAMUSCULAR; INTRAVENOUS ONCE
Status: COMPLETED | OUTPATIENT
Start: 2025-08-08 | End: 2025-08-08

## 2025-08-08 RX ORDER — METRONIDAZOLE 250 MG/1
250 TABLET ORAL 3 TIMES DAILY
Qty: 21 TABLET | Refills: 0 | Status: SHIPPED | OUTPATIENT
Start: 2025-08-08 | End: 2025-08-15

## 2025-08-08 RX ORDER — CIPROFLOXACIN 500 MG/1
500 TABLET, FILM COATED ORAL 2 TIMES DAILY
Qty: 14 TABLET | Refills: 0 | Status: SHIPPED | OUTPATIENT
Start: 2025-08-08 | End: 2025-08-15

## 2025-08-08 RX ADMIN — SODIUM CHLORIDE 1000 ML: 0.9 INJECTION, SOLUTION INTRAVENOUS at 14:44

## 2025-08-08 RX ADMIN — ONDANSETRON 4 MG: 2 INJECTION INTRAMUSCULAR; INTRAVENOUS at 20:29

## 2025-08-08 RX ADMIN — MORPHINE SULFATE 4 MG: 4 INJECTION INTRAVENOUS at 21:59

## 2025-08-08 RX ADMIN — IOPAMIDOL 100 ML: 755 INJECTION, SOLUTION INTRAVENOUS at 14:36

## 2025-08-08 RX ADMIN — MORPHINE SULFATE 4 MG: 4 INJECTION, SOLUTION INTRAMUSCULAR; INTRAVENOUS at 20:30

## 2025-08-08 ASSESSMENT — LIFESTYLE VARIABLES
HOW OFTEN DO YOU HAVE A DRINK CONTAINING ALCOHOL: NEVER
HOW MANY STANDARD DRINKS CONTAINING ALCOHOL DO YOU HAVE ON A TYPICAL DAY: PATIENT DOES NOT DRINK

## 2025-08-08 ASSESSMENT — PAIN SCALES - GENERAL
PAINLEVEL_OUTOF10: 8
PAINLEVEL_OUTOF10: 8

## 2025-08-08 ASSESSMENT — PAIN DESCRIPTION - ORIENTATION: ORIENTATION: RIGHT

## 2025-08-08 ASSESSMENT — PAIN - FUNCTIONAL ASSESSMENT
PAIN_FUNCTIONAL_ASSESSMENT: 0-10
PAIN_FUNCTIONAL_ASSESSMENT: 0-10

## 2025-08-08 ASSESSMENT — PAIN DESCRIPTION - LOCATION: LOCATION: ABDOMEN

## 2025-08-09 LAB
EKG ATRIAL RATE: 83 BPM
EKG DIAGNOSIS: NORMAL
EKG P AXIS: 55 DEGREES
EKG P-R INTERVAL: 126 MS
EKG Q-T INTERVAL: 390 MS
EKG QRS DURATION: 82 MS
EKG QTC CALCULATION (BAZETT): 458 MS
EKG R AXIS: -26 DEGREES
EKG T AXIS: 7 DEGREES
EKG VENTRICULAR RATE: 83 BPM

## 2025-08-09 PROCEDURE — 93010 ELECTROCARDIOGRAM REPORT: CPT | Performed by: SPECIALIST

## 2025-08-12 ENCOUNTER — OFFICE VISIT (OUTPATIENT)
Facility: CLINIC | Age: 60
End: 2025-08-12
Payer: COMMERCIAL

## 2025-08-12 VITALS
TEMPERATURE: 98.2 F | RESPIRATION RATE: 16 BRPM | DIASTOLIC BLOOD PRESSURE: 88 MMHG | HEIGHT: 62 IN | BODY MASS INDEX: 27.9 KG/M2 | OXYGEN SATURATION: 99 % | WEIGHT: 151.6 LBS | HEART RATE: 81 BPM | SYSTOLIC BLOOD PRESSURE: 130 MMHG

## 2025-08-12 DIAGNOSIS — T88.7XXA MEDICATION SIDE EFFECTS: ICD-10-CM

## 2025-08-12 DIAGNOSIS — K57.92 DIVERTICULITIS: Primary | ICD-10-CM

## 2025-08-12 DIAGNOSIS — R42 DIZZINESS AND GIDDINESS: ICD-10-CM

## 2025-08-12 DIAGNOSIS — T50.905A DRUG-INDUCED NAUSEA AND VOMITING: ICD-10-CM

## 2025-08-12 DIAGNOSIS — R10.32 LEFT LOWER QUADRANT ABDOMINAL PAIN: ICD-10-CM

## 2025-08-12 DIAGNOSIS — R11.2 DRUG-INDUCED NAUSEA AND VOMITING: ICD-10-CM

## 2025-08-12 PROCEDURE — 3075F SYST BP GE 130 - 139MM HG: CPT | Performed by: FAMILY MEDICINE

## 2025-08-12 PROCEDURE — 3079F DIAST BP 80-89 MM HG: CPT | Performed by: FAMILY MEDICINE

## 2025-08-12 PROCEDURE — 99213 OFFICE O/P EST LOW 20 MIN: CPT | Performed by: FAMILY MEDICINE

## 2025-08-12 RX ORDER — MECLIZINE HCL 12.5 MG 12.5 MG/1
12.5 TABLET ORAL 3 TIMES DAILY PRN
Qty: 15 TABLET | Refills: 0 | Status: SHIPPED | OUTPATIENT
Start: 2025-08-12 | End: 2025-08-22

## 2025-08-12 RX ORDER — PREDNISONE 10 MG/1
10 TABLET ORAL 2 TIMES DAILY
Qty: 10 TABLET | Refills: 0 | Status: SHIPPED | OUTPATIENT
Start: 2025-08-12 | End: 2025-08-17

## 2025-08-18 RX ORDER — AMLODIPINE BESYLATE 5 MG/1
5 TABLET ORAL DAILY
Qty: 90 TABLET | Refills: 0 | Status: SHIPPED | OUTPATIENT
Start: 2025-08-18

## 2025-09-04 ENCOUNTER — OFFICE VISIT (OUTPATIENT)
Facility: CLINIC | Age: 60
End: 2025-09-04
Payer: COMMERCIAL

## 2025-09-04 VITALS
WEIGHT: 147.4 LBS | RESPIRATION RATE: 16 BRPM | OXYGEN SATURATION: 98 % | HEIGHT: 62 IN | BODY MASS INDEX: 27.12 KG/M2 | DIASTOLIC BLOOD PRESSURE: 82 MMHG | SYSTOLIC BLOOD PRESSURE: 138 MMHG | TEMPERATURE: 98.1 F | HEART RATE: 74 BPM

## 2025-09-04 DIAGNOSIS — Z12.4 PAP SMEAR FOR CERVICAL CANCER SCREENING: Primary | ICD-10-CM

## 2025-09-04 DIAGNOSIS — N32.81 OAB (OVERACTIVE BLADDER): ICD-10-CM

## 2025-09-04 DIAGNOSIS — N81.10 BLADDER PROLAPSE, FEMALE, ACQUIRED: ICD-10-CM

## 2025-09-04 DIAGNOSIS — R35.0 FREQUENCY OF MICTURITION: ICD-10-CM

## 2025-09-04 PROCEDURE — 3075F SYST BP GE 130 - 139MM HG: CPT | Performed by: FAMILY MEDICINE

## 2025-09-04 PROCEDURE — 99214 OFFICE O/P EST MOD 30 MIN: CPT | Performed by: FAMILY MEDICINE

## 2025-09-04 PROCEDURE — 3079F DIAST BP 80-89 MM HG: CPT | Performed by: FAMILY MEDICINE

## 2025-09-04 RX ORDER — SOLIFENACIN SUCCINATE 5 MG/1
5 TABLET, FILM COATED ORAL DAILY
Qty: 30 TABLET | Refills: 2 | Status: SHIPPED | OUTPATIENT
Start: 2025-09-04

## 2025-09-07 LAB
CYTOLOGIST CVX/VAG CYTO: NORMAL
CYTOLOGY CVX/VAG DOC CYTO: NORMAL
CYTOLOGY CVX/VAG DOC THIN PREP: NORMAL
DX ICD CODE: NORMAL
HPV GENOTYPE REFLEX: NORMAL
HPV I/H RISK 4 DNA CVX QL PROBE+SIG AMP: NEGATIVE
OTHER STN SPEC: NORMAL
SERVICE CMNT-IMP: NORMAL
STAT OF ADQ CVX/VAG CYTO-IMP: NORMAL

## (undated) DEVICE — MASK O2 MED AD 7 FT 3 IN 1 W/ STD CONN LTX

## (undated) DEVICE — CANNULA NSL O2 AD 7 FT END-TIDAL CARBON DIOX VENTFLO

## (undated) DEVICE — MASK ANES INF SZ 2 PREM TAIL VLV INFL PRT UNSCENTED SGL PT

## (undated) DEVICE — FORCEPS BX 240CM 2.4MM L NDL RAD JAW 4 M00513334

## (undated) DEVICE — KIT ENDOSCOPIC  PROC VIA